# Patient Record
Sex: MALE | Race: WHITE | NOT HISPANIC OR LATINO
[De-identification: names, ages, dates, MRNs, and addresses within clinical notes are randomized per-mention and may not be internally consistent; named-entity substitution may affect disease eponyms.]

---

## 2019-07-19 ENCOUNTER — APPOINTMENT (OUTPATIENT)
Dept: DERMATOLOGY | Facility: CLINIC | Age: 69
End: 2019-07-19
Payer: MEDICARE

## 2019-07-19 PROBLEM — Z00.00 ENCOUNTER FOR PREVENTIVE HEALTH EXAMINATION: Status: ACTIVE | Noted: 2019-07-19

## 2019-07-19 PROCEDURE — 99201 OFFICE OUTPATIENT NEW 10 MINUTES: CPT

## 2019-11-27 ENCOUNTER — APPOINTMENT (OUTPATIENT)
Dept: DERMATOLOGY | Facility: CLINIC | Age: 69
End: 2019-11-27
Payer: MEDICARE

## 2019-11-27 ENCOUNTER — RESULT REVIEW (OUTPATIENT)
Age: 69
End: 2019-11-27

## 2019-11-27 PROCEDURE — 17110 DESTRUCTION B9 LES UP TO 14: CPT

## 2019-11-27 PROCEDURE — 99212 OFFICE O/P EST SF 10 MIN: CPT | Mod: 25

## 2020-07-31 ENCOUNTER — APPOINTMENT (OUTPATIENT)
Dept: DERMATOLOGY | Facility: CLINIC | Age: 70
End: 2020-07-31
Payer: MEDICARE

## 2020-07-31 PROCEDURE — 99214 OFFICE O/P EST MOD 30 MIN: CPT

## 2020-12-11 ENCOUNTER — APPOINTMENT (OUTPATIENT)
Dept: DERMATOLOGY | Facility: CLINIC | Age: 70
End: 2020-12-11
Payer: MEDICARE

## 2020-12-11 PROCEDURE — 99214 OFFICE O/P EST MOD 30 MIN: CPT

## 2021-01-20 ENCOUNTER — APPOINTMENT (OUTPATIENT)
Dept: DERMATOLOGY | Facility: CLINIC | Age: 71
End: 2021-01-20
Payer: MEDICARE

## 2021-01-20 ENCOUNTER — RESULT REVIEW (OUTPATIENT)
Age: 71
End: 2021-01-20

## 2021-01-20 PROCEDURE — 99213 OFFICE O/P EST LOW 20 MIN: CPT | Mod: 25

## 2021-01-20 PROCEDURE — 11102 TANGNTL BX SKIN SINGLE LES: CPT

## 2021-01-20 PROCEDURE — 11103 TANGNTL BX SKIN EA SEP/ADDL: CPT

## 2021-02-04 ENCOUNTER — APPOINTMENT (OUTPATIENT)
Dept: DERMATOLOGY | Facility: CLINIC | Age: 71
End: 2021-02-04
Payer: MEDICARE

## 2021-02-04 PROCEDURE — 13132 CMPLX RPR F/C/C/M/N/AX/G/H/F: CPT

## 2021-02-04 PROCEDURE — 17311 MOHS 1 STAGE H/N/HF/G: CPT

## 2021-02-25 ENCOUNTER — APPOINTMENT (OUTPATIENT)
Dept: DERMATOLOGY | Facility: CLINIC | Age: 71
End: 2021-02-25
Payer: MEDICARE

## 2021-02-25 PROCEDURE — 99213 OFFICE O/P EST LOW 20 MIN: CPT

## 2021-10-28 ENCOUNTER — APPOINTMENT (OUTPATIENT)
Dept: PULMONOLOGY | Facility: CLINIC | Age: 71
End: 2021-10-28
Payer: MEDICARE

## 2021-10-28 ENCOUNTER — INPATIENT (INPATIENT)
Facility: HOSPITAL | Age: 71
LOS: 0 days | Discharge: HOME | DRG: 190 | End: 2021-10-29
Attending: INTERNAL MEDICINE | Admitting: INTERNAL MEDICINE
Payer: MEDICARE

## 2021-10-28 VITALS
RESPIRATION RATE: 18 BRPM | SYSTOLIC BLOOD PRESSURE: 136 MMHG | BODY MASS INDEX: 42.66 KG/M2 | DIASTOLIC BLOOD PRESSURE: 74 MMHG | OXYGEN SATURATION: 90 % | HEART RATE: 81 BPM | HEIGHT: 72 IN | WEIGHT: 315 LBS

## 2021-10-28 VITALS
DIASTOLIC BLOOD PRESSURE: 101 MMHG | WEIGHT: 315 LBS | OXYGEN SATURATION: 92 % | RESPIRATION RATE: 20 BRPM | TEMPERATURE: 99 F | HEART RATE: 81 BPM | SYSTOLIC BLOOD PRESSURE: 169 MMHG

## 2021-10-28 DIAGNOSIS — R60.0 LOCALIZED EDEMA: ICD-10-CM

## 2021-10-28 DIAGNOSIS — I27.20 PULMONARY HYPERTENSION, UNSPECIFIED: ICD-10-CM

## 2021-10-28 DIAGNOSIS — J44.1 CHRONIC OBSTRUCTIVE PULMONARY DISEASE WITH (ACUTE) EXACERBATION: ICD-10-CM

## 2021-10-28 LAB
ALBUMIN SERPL ELPH-MCNC: 3.8 G/DL — SIGNIFICANT CHANGE UP (ref 3.3–5.2)
ALP SERPL-CCNC: 88 U/L — SIGNIFICANT CHANGE UP (ref 40–120)
ALT FLD-CCNC: 16 U/L — SIGNIFICANT CHANGE UP
ANION GAP SERPL CALC-SCNC: 9 MMOL/L — SIGNIFICANT CHANGE UP (ref 5–17)
AST SERPL-CCNC: 16 U/L — SIGNIFICANT CHANGE UP
BASOPHILS # BLD AUTO: 0.04 K/UL — SIGNIFICANT CHANGE UP (ref 0–0.2)
BASOPHILS NFR BLD AUTO: 0.5 % — SIGNIFICANT CHANGE UP (ref 0–2)
BILIRUB SERPL-MCNC: 0.5 MG/DL — SIGNIFICANT CHANGE UP (ref 0.4–2)
BUN SERPL-MCNC: 13.2 MG/DL — SIGNIFICANT CHANGE UP (ref 8–20)
CALCIUM SERPL-MCNC: 8.6 MG/DL — SIGNIFICANT CHANGE UP (ref 8.6–10.2)
CHLORIDE SERPL-SCNC: 94 MMOL/L — LOW (ref 98–107)
CO2 SERPL-SCNC: 31 MMOL/L — HIGH (ref 22–29)
CREAT SERPL-MCNC: 0.73 MG/DL — SIGNIFICANT CHANGE UP (ref 0.5–1.3)
D DIMER BLD IA.RAPID-MCNC: 176 NG/ML DDU — SIGNIFICANT CHANGE UP
EOSINOPHIL # BLD AUTO: 0.1 K/UL — SIGNIFICANT CHANGE UP (ref 0–0.5)
EOSINOPHIL NFR BLD AUTO: 1.1 % — SIGNIFICANT CHANGE UP (ref 0–6)
GAS PNL BLDA: SIGNIFICANT CHANGE UP
GLUCOSE SERPL-MCNC: 98 MG/DL — SIGNIFICANT CHANGE UP (ref 70–99)
HCT VFR BLD CALC: 41.7 % — SIGNIFICANT CHANGE UP (ref 39–50)
HGB BLD-MCNC: 13.8 G/DL — SIGNIFICANT CHANGE UP (ref 13–17)
IMM GRANULOCYTES NFR BLD AUTO: 0.3 % — SIGNIFICANT CHANGE UP (ref 0–1.5)
LYMPHOCYTES # BLD AUTO: 1.83 K/UL — SIGNIFICANT CHANGE UP (ref 1–3.3)
LYMPHOCYTES # BLD AUTO: 20.6 % — SIGNIFICANT CHANGE UP (ref 13–44)
MCHC RBC-ENTMCNC: 33.1 GM/DL — SIGNIFICANT CHANGE UP (ref 32–36)
MCHC RBC-ENTMCNC: 33.5 PG — SIGNIFICANT CHANGE UP (ref 27–34)
MCV RBC AUTO: 101.2 FL — HIGH (ref 80–100)
MONOCYTES # BLD AUTO: 0.74 K/UL — SIGNIFICANT CHANGE UP (ref 0–0.9)
MONOCYTES NFR BLD AUTO: 8.3 % — SIGNIFICANT CHANGE UP (ref 2–14)
NEUTROPHILS # BLD AUTO: 6.13 K/UL — SIGNIFICANT CHANGE UP (ref 1.8–7.4)
NEUTROPHILS NFR BLD AUTO: 69.2 % — SIGNIFICANT CHANGE UP (ref 43–77)
NT-PROBNP SERPL-SCNC: 266 PG/ML — SIGNIFICANT CHANGE UP (ref 0–300)
PLATELET # BLD AUTO: 258 K/UL — SIGNIFICANT CHANGE UP (ref 150–400)
POTASSIUM SERPL-MCNC: 4.5 MMOL/L — SIGNIFICANT CHANGE UP (ref 3.5–5.3)
POTASSIUM SERPL-SCNC: 4.5 MMOL/L — SIGNIFICANT CHANGE UP (ref 3.5–5.3)
PROT SERPL-MCNC: 6.7 G/DL — SIGNIFICANT CHANGE UP (ref 6.6–8.7)
RAPID RVP RESULT: SIGNIFICANT CHANGE UP
RBC # BLD: 4.12 M/UL — LOW (ref 4.2–5.8)
RBC # FLD: 13.6 % — SIGNIFICANT CHANGE UP (ref 10.3–14.5)
SARS-COV-2 RNA SPEC QL NAA+PROBE: SIGNIFICANT CHANGE UP
SODIUM SERPL-SCNC: 134 MMOL/L — LOW (ref 135–145)
TROPONIN T SERPL-MCNC: <0.01 NG/ML — SIGNIFICANT CHANGE UP (ref 0–0.06)
WBC # BLD: 8.87 K/UL — SIGNIFICANT CHANGE UP (ref 3.8–10.5)
WBC # FLD AUTO: 8.87 K/UL — SIGNIFICANT CHANGE UP (ref 3.8–10.5)

## 2021-10-28 PROCEDURE — 99285 EMERGENCY DEPT VISIT HI MDM: CPT

## 2021-10-28 PROCEDURE — 71045 X-RAY EXAM CHEST 1 VIEW: CPT | Mod: 26

## 2021-10-28 PROCEDURE — 99223 1ST HOSP IP/OBS HIGH 75: CPT

## 2021-10-28 PROCEDURE — 99204 OFFICE O/P NEW MOD 45 MIN: CPT | Mod: 25

## 2021-10-28 PROCEDURE — 99406 BEHAV CHNG SMOKING 3-10 MIN: CPT

## 2021-10-28 PROCEDURE — 93306 TTE W/DOPPLER COMPLETE: CPT | Mod: 26

## 2021-10-28 RX ORDER — IPRATROPIUM/ALBUTEROL SULFATE 18-103MCG
3 AEROSOL WITH ADAPTER (GRAM) INHALATION ONCE
Refills: 0 | Status: COMPLETED | OUTPATIENT
Start: 2021-10-28 | End: 2021-10-28

## 2021-10-28 RX ORDER — IPRATROPIUM/ALBUTEROL SULFATE 18-103MCG
3 AEROSOL WITH ADAPTER (GRAM) INHALATION EVERY 6 HOURS
Refills: 0 | Status: DISCONTINUED | OUTPATIENT
Start: 2021-10-28 | End: 2021-10-29

## 2021-10-28 RX ORDER — ENOXAPARIN SODIUM 100 MG/ML
40 INJECTION SUBCUTANEOUS
Refills: 0 | Status: DISCONTINUED | OUTPATIENT
Start: 2021-10-28 | End: 2021-10-29

## 2021-10-28 RX ORDER — ALBUTEROL 90 UG/1
1 AEROSOL, METERED ORAL
Qty: 2 | Refills: 0
Start: 2021-10-28

## 2021-10-28 RX ORDER — BUDESONIDE AND FORMOTEROL FUMARATE DIHYDRATE 160; 4.5 UG/1; UG/1
2 AEROSOL RESPIRATORY (INHALATION)
Refills: 0 | Status: DISCONTINUED | OUTPATIENT
Start: 2021-10-28 | End: 2021-10-29

## 2021-10-28 RX ORDER — IPRATROPIUM/ALBUTEROL SULFATE 18-103MCG
3 AEROSOL WITH ADAPTER (GRAM) INHALATION
Refills: 0 | Status: DISCONTINUED | OUTPATIENT
Start: 2021-10-28 | End: 2021-10-29

## 2021-10-28 RX ORDER — TIOTROPIUM BROMIDE 18 UG/1
1 CAPSULE ORAL; RESPIRATORY (INHALATION) DAILY
Refills: 0 | Status: DISCONTINUED | OUTPATIENT
Start: 2021-10-28 | End: 2021-10-29

## 2021-10-28 RX ORDER — NICOTINE POLACRILEX 2 MG
1 GUM BUCCAL DAILY
Refills: 0 | Status: DISCONTINUED | OUTPATIENT
Start: 2021-10-28 | End: 2021-10-29

## 2021-10-28 RX ADMIN — Medication 3 MILLILITER(S): at 23:15

## 2021-10-28 RX ADMIN — Medication 3 MILLILITER(S): at 10:39

## 2021-10-28 RX ADMIN — Medication 125 MILLIGRAM(S): at 10:39

## 2021-10-28 RX ADMIN — Medication 40 MILLIGRAM(S): at 18:17

## 2021-10-28 NOTE — PHYSICAL EXAM
[No Acute Distress] : no acute distress [Normal Appearance] : normal appearance [Normal Rate/Rhythm] : normal rate/rhythm [Normal S1, S2] : normal s1, s2 [No Murmurs] : no murmurs [No Acc Muscle Use] : no acc muscle use [Normal Rhythm and Effort] : normal rhythm and effort [Normal to Percussion] : normal to percussion [No Abnormalities] : no abnormalities [Normal Gait] : normal gait [No Clubbing] : no clubbing [No Cyanosis] : no cyanosis [TextBox_68] : decreased aiar entry bilat [TextBox_105] : 1 plus edema with stasis dermatidis

## 2021-10-28 NOTE — ED PROVIDER NOTE - PROGRESS NOTE DETAILS
Jose E: Attending spoke to Dr. Brown, pulmonolgist who sent him in. He states he has JOSLYN, needs ABG for CPAP machine script. Spoke to him about ABG results. Will place patient in obs until he is able to get machine. Jsoe E: Attending spoke to Dr. Brown, pulmonologist who sent him in. He states he has JOSLYN, needs ABG for CPAP machine script. Spoke to him about ABG results. Will keep patient until NIV is set up.

## 2021-10-28 NOTE — CONSULT NOTE ADULT - SUBJECTIVE AND OBJECTIVE BOX
PULMONARY CONSULT NOTE      PENNY FORRESTERN-[id number]    Patient is a 71y old  Male who presents with a chief complaint of     INTERVAL HPI/OVERNIGHT EVENTS:· HPI 70 yo male who denies medical hx presents to ED for SOB. States it has been present for years. Worsened in the last year. Went to see pulmonolgist for the first time who sent him to ED. Patient states SOB worse with exertion, assc with cough. No orthopnea. +Lower extremity edema. Denies chest pain, fever.50 pk yr smoker.        MEDICATIONS  (STANDING):      MEDICATIONS  (PRN):      Allergies    No Known Allergies    Intolerances        PAST MEDICAL & SURGICAL HISTORY:  No pertinent past medical history    No significant past surgical history        FAMILY HISTORY:      SOCIAL HISTORY  Smoking History:     REVIEW OF SYSTEMS:    CONSTITUTIONAL:  As per HPI.    HEENT:  Eyes:  No diplopia or blurred vision. ENT:  No earache, sore throat or runny nose.    CARDIOVASCULAR:  No pressure, squeezing, tightness, heaviness or aching about the chest; no palpitations.    RESPIRATORY:  Per HPI    GASTROINTESTINAL:  No nausea, vomiting or diarrhea.    GENITOURINARY:  No dysuria, frequency or urgency.    MUSCULOSKELETAL:  No joint pains    SKIN:  No new lesions.    NEUROLOGIC:  No paresthesias, fasciculations, seizures or weakness.    PSYCHIATRIC:  No disorder of thought or mood.    ENDOCRINE:  No heat or cold intolerance, polyuria or polydipsia.    HEMATOLOGICAL:  No easy bruising or bleeding.     Vital Signs Last 24 Hrs  T(C): 37.1 (28 Oct 2021 08:50), Max: 37.1 (28 Oct 2021 08:50)  T(F): 98.7 (28 Oct 2021 08:50), Max: 98.7 (28 Oct 2021 08:50)  HR: 81 (28 Oct 2021 08:50) (81 - 81)  BP: 169/101 (28 Oct 2021 08:50) (169/101 - 169/101)  BP(mean): --  RR: 20 (28 Oct 2021 08:50) (20 - 20)  SpO2: 92% (28 Oct 2021 08:50) (92% - 92%)    PHYSICAL EXAMINATION:    GENERAL: The patient is a well-developed, morbidly obese WM_in no apparent distress.     HEENT: Head is normocephalic and atraumatic. Extraocular muscles are intact. Mucous membranes are moist.     NECK: Supple.     LUNGS: diminished bs bilat    HEART: Regular rate and rhythm without murmur.    ABDOMEN: Soft, nontender, and nondistended.  No hepatosplenomegaly is noted.    EXTREMITIES: 2+ edema, stasis changes.    NEUROLOGIC: Grossly intact.    SKIN: No ulceration or induration present.      LABS:                        13.8   8.87  )-----------( 258      ( 28 Oct 2021 10:38 )             41.7     10-28    134<L>  |  94<L>  |  13.2  ----------------------------<  98  4.5   |  31.0<H>  |  0.73    Ca    8.6      28 Oct 2021 10:38    TPro  6.7  /  Alb  3.8  /  TBili  0.5  /  DBili  x   /  AST  16  /  ALT  16  /  AlkPhos  88  10-28        ABG - ( 28 Oct 2021 11:51 )  pH, Arterial: 7.370 pH, Blood: x     /  pCO2: 60    /  pO2: 66    / HCO3: 35    / Base Excess: 9.4   /  SaO2: 93.7  (RA)            CARDIAC MARKERS ( 28 Oct 2021 10:38 )  x     / <0.01 ng/mL / x     / x     / x          D-Dimer Assay, Quantitative: 176 ng/mL DDU (10-28-21 @ 10:38)    Serum Pro-Brain Natriuretic Peptide: 266 pg/mL (10-28-21 @ 10:38)          MICROBIOLOGY:    RADIOLOGY & ADDITIONAL STUDIES:cxr without infiltrates or chf.  Nl ht size

## 2021-10-28 NOTE — H&P ADULT - HISTORY OF PRESENT ILLNESS
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   HPI obtained from pt, who reports that over last few month he developed slowly progressing SOB a/w with occasional wheezing, productive cough, PND and orthopnea for which he finally was able to see Dr. Kan today and was sent to ER due to concern of sever dyspnea on ambulation with desaturations to 86 in office. At the moment seen pt reports no chest pain, leg pain, syncopal episodes, n/v/d, changes in urination and bm, he reported no medication or using inhaler  at home. he endorses chronic leg edema and skin discoloration.   On arriving to ED, pt is desaturates on ambulation to 87, with normal cbc, bmp, abg 7.37/60/66/35, LA 1.1, COVID neg, ecg with sinus, , negative d-dimers,  w/o apparent ischemic changes. Pt was seen by pulmonology team and was recommended for admission.

## 2021-10-28 NOTE — ED ADULT NURSE NOTE - NSIMPLEMENTINTERV_GEN_ALL_ED
Implemented All Universal Safety Interventions:  Scalf to call system. Call bell, personal items and telephone within reach. Instruct patient to call for assistance. Room bathroom lighting operational. Non-slip footwear when patient is off stretcher. Physically safe environment: no spills, clutter or unnecessary equipment. Stretcher in lowest position, wheels locked, appropriate side rails in place.

## 2021-10-28 NOTE — ED PROVIDER NOTE - CLINICAL SUMMARY MEDICAL DECISION MAKING FREE TEXT BOX
70 yo male with SOB. Does not see doctor. Mild rhonchi in right base. Everyday smoker, will treat. Will order labs, ekg.

## 2021-10-28 NOTE — ED ADULT NURSE NOTE - BOWEL SOUNDS LLQ
present Bilobed Transposition Flap Text: The defect edges were debeveled with a #15 scalpel blade.  Given the location of the defect and the proximity to free margins a bilobed transposition flap was deemed most appropriate.  Using a sterile surgical marker, an appropriate bilobe flap drawn around the defect.    The area thus outlined was incised deep to adipose tissue with a #15 scalpel blade.  The skin margins were undermined to an appropriate distance in all directions utilizing iris scissors.

## 2021-10-28 NOTE — CONSULT LETTER
[Dear  ___] : Dear  [unfilled], [Consult Letter:] : I had the pleasure of evaluating your patient, [unfilled]. [Please see my note below.] : Please see my note below. [Sincerely,] : Sincerely, [FreeTextEntry3] : [facility] De Jesus Brown DO [facility]\par Pulmonary Critical Care\par Director Pulmonary Division\par Medical Director Respiratory Therapy\par [facility]\par \par

## 2021-10-28 NOTE — H&P ADULT - PROBLEM SELECTOR PLAN 1
- admitted to tele bed for next 24 hr  - abg noted, pt mentation well and on room air at the moment, w/o WOB  - started on iv steroids 40 iv daily, duonebs q6h, ICS, spiriva  - nocturnal bipap 20/10/12 with abg in am   - cm/sw team consulted to assist with trilogy at home

## 2021-10-28 NOTE — H&P ADULT - NSHPPHYSICALEXAM_GEN_ALL_CORE
Vital Signs Last 24 Hrs  T(C): 37.1 (28 Oct 2021 15:47), Max: 37.1 (28 Oct 2021 08:50)  T(F): 98.7 (28 Oct 2021 15:47), Max: 98.7 (28 Oct 2021 08:50)  HR: 88 (28 Oct 2021 15:47) (81 - 88)  BP: 158/72 (28 Oct 2021 15:47) (158/72 - 169/101)  BP(mean): --  RR: 20 (28 Oct 2021 15:47) (20 - 20)  SpO2: 94% (28 Oct 2021 15:47) (92% - 94%)     General: NAD, truncal obesity    ENMT: no conjunctival injection, moist oral mucoses, good dentition   Neck and Lymph: no palpable masses in thyroids, no JVD, no lymphadenopathy   Lungs: poor air entry b/l, minimal scattered wheezing with somewhat prolong expiratory phase,  no crackles, no stridor  CVS: RRR, no M/R/G, + 1 pitting peripheral edema, palpable pedal pulses +2  Abdomen: soft, not distended  Extremities: no apparent deformities, preserved ROM  Skin: warm, dry, with 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   Pschyc; appropriate thought process, mood, response

## 2021-10-28 NOTE — ED ADULT NURSE NOTE - OBJECTIVE STATEMENT
Patient A&O x 3, stated he is fine and is just here for blood work. Patient stated he was sent to the ED by his doctor. No distress observed.

## 2021-10-28 NOTE — H&P ADULT - PROBLEM SELECTOR PLAN 3
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

## 2021-10-28 NOTE — CONSULT NOTE ADULT - ASSESSMENT
Imp--Pt with COPD, hypercapnic resp failure--chronic.  Adequate RA sat.  No active CHF  Plan--  Nebs, symbicort/spiriva.  The patient has chronic hypercapnic respiratory failure   due to COPD     and responded to NIV.  The patient requires advanced therapies for chronic respiratory failure.  At this time other therapies such as BiPAP-ST have been attempted and failed.  NIV in Guaranteed Augmented Targeted Tidal Volume modes not available on any BiPAP or BiPAP ST devices will be ordereed to minimize the likelihood of further clinical decline or rehospitalizations.    can go home once NIV in place.  Also will need home O2. F/U our office

## 2021-10-28 NOTE — H&P ADULT - NSHPLABSRESULTS_GEN_ALL_CORE
LABS:                        13.8   8.87  )-----------( 258      ( 28 Oct 2021 10:38 )             41.7     10-28    134<L>  |  94<L>  |  13.2  ----------------------------<  98  4.5   |  31.0<H>  |  0.73    Ca    8.6      28 Oct 2021 10:38    TPro  6.7  /  Alb  3.8  /  TBili  0.5  /  DBili  x   /  AST  16  /  ALT  16  /  AlkPhos  88  10-28      CARDIAC MARKERS ( 28 Oct 2021 10:38 )  x     / <0.01 ng/mL / x     / x     / x              CAPILLARY BLOOD GLUCOSE            RADIOLOGY & ADDITIONAL TESTS:  Results Reviewed:   Imaging Personally Reviewed: CXR   Electrocardiogram Personally Reviewed: SINUS, LAD, no apparent ischemic changes, qtc 439

## 2021-10-28 NOTE — ED PROVIDER NOTE - ATTENDING CONTRIBUTION TO CARE
The patient seen and examined    COPD exacerbation    I, Mayito Dotson, performed the initial face to face bedside interview with this patient regarding history of present illness, review of symptoms and relevant past medical, social and family history.  I completed an independent physical examination.  I was the initial provider who evaluated this patient. I have signed out the follow up of any pending tests (i.e. labs, radiological studies) to the resident.  I have communicated the patient’s plan of care and disposition with the resident.

## 2021-10-28 NOTE — ED PROVIDER NOTE - OBJECTIVE STATEMENT
70 yo male who denies medical hx presents to ED for SOB. States it has been present for years. Worsened in the last year. Went to see pulmonolgist for the first time who sent him to ED. Patient states SOB worse with exertion, assc with cough. No orthopnea. +Lower extremity edema. Denies chest pain, fever.

## 2021-10-28 NOTE — DISCUSSION/SUMMARY
[FreeTextEntry1] : Clinical COPD /Obesity/OHS \par Acute on chronic hypoxemic, hypercapnic respiratory failure\par Nicotine addiction\par Clinical cor pulmonale\par Long discussion with pt and wife\par Needs admission with CXR, ABG, EKG, D dimer, Labs\par Will need nocturnal NIV and home 02\par Smoking cessation\par Medrol, nebs, abx, Trelegy as out pt, home neb\par Vaccinations need strongly encouraged\par Will follow up post ER\par prognosis guarded\par ER called, and case discussed with Dr Bailey\par

## 2021-10-28 NOTE — HISTORY OF PRESENT ILLNESS
[TextBox_4] : heavy snorer\par witnessed apneas\par smoker x 40 yrs \par no fever, chill, chest pain\par very SOB with everyday activities\par no chest pain\par doesn’t see any doctors\par cats\par cough with sputum\par no hx COPD and asthma\par weight gain, last few years\par unvaccinated

## 2021-10-28 NOTE — COUNSELING
[Risk of tobacco use and health benefits of smoking cessation discussed] : Risk of tobacco use and health benefits of smoking cessation discussed [Cessation strategies including cessation program discussed] : Cessation strategies including cessation program discussed [Use of nicotine replacement therapies and other medications discussed] : Use of nicotine replacement therapies and other medications discussed [Encouraged to pick a quit date and identify support needed to quit] : Encouraged to pick a quit date and identify support needed to quit [Tobacco Use Cessation Intermediate Greater Than 3 Minutes Up to 10 Minutes] : Tobacco Use Cessation Intermediate Greater Than 3 Minutes Up to 10 Minutes [FreeTextEntry1] : 4

## 2021-10-29 ENCOUNTER — TRANSCRIPTION ENCOUNTER (OUTPATIENT)
Age: 71
End: 2021-10-29

## 2021-10-29 VITALS
SYSTOLIC BLOOD PRESSURE: 148 MMHG | OXYGEN SATURATION: 91 % | TEMPERATURE: 98 F | RESPIRATION RATE: 18 BRPM | HEART RATE: 85 BPM | DIASTOLIC BLOOD PRESSURE: 72 MMHG

## 2021-10-29 DIAGNOSIS — J44.1 CHRONIC OBSTRUCTIVE PULMONARY DISEASE WITH (ACUTE) EXACERBATION: ICD-10-CM

## 2021-10-29 LAB
ANION GAP SERPL CALC-SCNC: 11 MMOL/L — SIGNIFICANT CHANGE UP (ref 5–17)
BASE EXCESS BLDA CALC-SCNC: 11.2 MMOL/L — HIGH (ref -2–3)
BLOOD GAS COMMENTS ARTERIAL: SIGNIFICANT CHANGE UP
BLOOD GAS COMMENTS ARTERIAL: SIGNIFICANT CHANGE UP
BUN SERPL-MCNC: 16.8 MG/DL — SIGNIFICANT CHANGE UP (ref 8–20)
CALCIUM SERPL-MCNC: 8.9 MG/DL — SIGNIFICANT CHANGE UP (ref 8.6–10.2)
CHLORIDE SERPL-SCNC: 94 MMOL/L — LOW (ref 98–107)
CO2 SERPL-SCNC: 30 MMOL/L — HIGH (ref 22–29)
COVID-19 SPIKE DOMAIN AB INTERP: NEGATIVE — SIGNIFICANT CHANGE UP
COVID-19 SPIKE DOMAIN ANTIBODY RESULT: 0.4 U/ML — SIGNIFICANT CHANGE UP
CREAT SERPL-MCNC: 0.83 MG/DL — SIGNIFICANT CHANGE UP (ref 0.5–1.3)
GAS PNL BLDA: SIGNIFICANT CHANGE UP
GLUCOSE SERPL-MCNC: 145 MG/DL — HIGH (ref 70–99)
HCO3 BLDA-SCNC: 36 MMOL/L — HIGH (ref 21–28)
HCV AB S/CO SERPL IA: 0.12 S/CO — SIGNIFICANT CHANGE UP (ref 0–0.99)
HCV AB SERPL-IMP: SIGNIFICANT CHANGE UP
HOROWITZ INDEX BLDA+IHG-RTO: 0.21 — SIGNIFICANT CHANGE UP
PCO2 BLDA: 55 MMHG — HIGH (ref 35–48)
PH BLDA: 7.42 — SIGNIFICANT CHANGE UP (ref 7.35–7.45)
PO2 BLDA: 64 MMHG — LOW (ref 83–108)
POTASSIUM SERPL-MCNC: 5 MMOL/L — SIGNIFICANT CHANGE UP (ref 3.5–5.3)
POTASSIUM SERPL-SCNC: 5 MMOL/L — SIGNIFICANT CHANGE UP (ref 3.5–5.3)
SAO2 % BLDA: 94 % — SIGNIFICANT CHANGE UP (ref 94–98)
SARS-COV-2 IGG+IGM SERPL QL IA: 0.4 U/ML — SIGNIFICANT CHANGE UP
SARS-COV-2 IGG+IGM SERPL QL IA: NEGATIVE — SIGNIFICANT CHANGE UP
SODIUM SERPL-SCNC: 135 MMOL/L — SIGNIFICANT CHANGE UP (ref 135–145)

## 2021-10-29 PROCEDURE — 99233 SBSQ HOSP IP/OBS HIGH 50: CPT

## 2021-10-29 RX ORDER — TIOTROPIUM BROMIDE 18 UG/1
1 CAPSULE ORAL; RESPIRATORY (INHALATION)
Qty: 30 | Refills: 0
Start: 2021-10-29 | End: 2021-11-27

## 2021-10-29 RX ORDER — BUDESONIDE AND FORMOTEROL FUMARATE DIHYDRATE 160; 4.5 UG/1; UG/1
2 AEROSOL RESPIRATORY (INHALATION)
Qty: 2 | Refills: 0
Start: 2021-10-29 | End: 2021-11-27

## 2021-10-29 RX ADMIN — Medication 3 MILLILITER(S): at 02:28

## 2021-10-29 RX ADMIN — Medication 3 MILLILITER(S): at 14:34

## 2021-10-29 RX ADMIN — BUDESONIDE AND FORMOTEROL FUMARATE DIHYDRATE 2 PUFF(S): 160; 4.5 AEROSOL RESPIRATORY (INHALATION) at 09:01

## 2021-10-29 RX ADMIN — Medication 40 MILLIGRAM(S): at 05:30

## 2021-10-29 RX ADMIN — Medication 3 MILLILITER(S): at 08:55

## 2021-10-29 NOTE — PROGRESS NOTE ADULT - ASSESSMENT
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 SPo2 on ambulation of 86% in the office. Admitted for acute hypoxic respiratory failure. ABG consistent with hypercapnia. COVID negative; BNP of 266, negative D dimmer.      Assessment/Plan:    1. Acute hypoxic hypercapnic respiratory failure: Wean o2 as tolerated  IV Steroids  Nebs  NIV nocturnal, will benefit from home nocturnal NIV  Pulmonary following    2. Pulmonary hypertension: Follow up echocardiogram    3. 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 SPo2 on ambulation of 86% in the office. Admitted for acute hypoxic respiratory failure. ABG consistent with hypercapnia. COVID negative; BNP of 266, negative D dimmer.      Assessment/Plan:    1. Acute hypoxic hypercapnic respiratory failure: Not on o2 at rest  Check Spo2 with ambulation  Taper Iv steroids  Nebs  NIV nocturnal, will benefit from home nocturnal NIV  Pulmonary following    2. Pulmonary hypertension: Follow up echocardiogram    3. 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 SPo2 on ambulation of 86% in the office. Admitted for acute hypoxic respiratory failure. ABG consistent with hypercapnia. COVID negative; BNP of 266, negative D dimmer.      Assessment/Plan:    1. Chronic hypoxic hypercapnic respiratory failure: Not on o2 at rest  Spo2 with ambulation on room air of 87%; patient will require home oxygen on discharge  Taper Iv steroids to PO on discharge  Nebs, symbicort and spiriva on discharge  NIV nocturnal, will benefit from home nocturnal NIV  Pulmonary following    2. Pulmonary hypertension: Follow up echocardiogram    3. Lower extremity edema: likely secondary to chorionic venous insufficieny >> will need OP f/u with vascular team  - TTE as above    VTE- Lovenox    Code Status; Full Code    Discharge disposition: Discharge home once home oxygen and trilogy setup and approved

## 2021-10-29 NOTE — PROGRESS NOTE ADULT - SUBJECTIVE AND OBJECTIVE BOX
PULMONARY PROGRESS NOTE      [id number]    Patient is a 71y old  Male who presents with a chief complaint of SOB (29 Oct 2021 08:28)  70 yo male who denies medical hx presents to ED for SOB. States it has been present for years. Worsened in the last year. Went to see pulmonolgist for the first time who sent him to ED. Patient states SOB worse with exertion, assc with cough. No orthopnea. +Lower extremity edema. Denies chest pain, fever.  50 pk yr smoker    INTERVAL HPI/OVERNIGHT EVENTS:  Little benefit to Bipap with back up rate overnight    MEDICATIONS  (STANDING):  albuterol/ipratropium for Nebulization 3 milliLiter(s) Nebulizer every 6 hours  budesonide 160 MICROgram(s)/formoterol 4.5 MICROgram(s) Inhaler 2 Puff(s) Inhalation two times a day  enoxaparin Injectable 40 milliGRAM(s) SubCutaneous two times a day  methylPREDNISolone sodium succinate Injectable 40 milliGRAM(s) IV Push daily  nicotine - 21 mG/24Hr(s) Patch 1 Patch Transdermal daily  tiotropium 18 MICROgram(s) Capsule 1 Capsule(s) Inhalation daily      MEDICATIONS  (PRN):  albuterol/ipratropium for Nebulization 3 milliLiter(s) Nebulizer every 2 hours PRN Shortness of Breath and/or Wheezing      Allergies    No Known Allergies    Intolerances        PAST MEDICAL & SURGICAL HISTORY:  No significant past surgical history        SOCIAL HISTORY  Smoking History:       REVIEW OF SYSTEMS:    CONSTITUTIONAL:  No distress    HEENT:  Eyes:  No diplopia or blurred vision. ENT:  No earache, sore throat or runny nose.    CARDIOVASCULAR:  No pressure, squeezing, tightness, heaviness or aching about the chest; no palpitations.    RESPIRATORY:  No cough,  PND or orthopnea. Mod SOBOE    GASTROINTESTINAL:  No nausea, vomiting or diarrhea.    GENITOURINARY:  No dysuria, frequency or urgency.    NEUROLOGIC:  No paresthesias, fasciculations, seizures or weakness.    PSYCHIATRIC:  No disorder of thought or mood.    Vital Signs Last 24 Hrs  T(C): 36.9 (29 Oct 2021 06:26), Max: 37.1 (28 Oct 2021 15:47)  T(F): 98.4 (29 Oct 2021 06:26), Max: 98.7 (28 Oct 2021 15:47)  HR: 88 (29 Oct 2021 09:01) (76 - 89)  BP: 130/68 (29 Oct 2021 06:26) (130/68 - 170/69)  BP(mean): --  RR: 18 (29 Oct 2021 06:26) (17 - 20)  SpO2: 92% (29 Oct 2021 09:01) (92% - 96%)    PHYSICAL EXAMINATION:       LUNGS: Diminished to auscultation without wheezing, rales or rhonchi;     HEART: Regular rate and rhythm without murmur.    ABDOMEN: Soft, nontender, and nondistended.      EXTREMITIES: Without any cyanosis, clubbing, rash, lesions or edema.    NEUROLOGIC: Grossly intact.    LABS:                        13.8   8.87  )-----------( 258      ( 28 Oct 2021 10:38 )             41.7     10-29    135  |  94<L>  |  16.8  ----------------------------<  145<H>  5.0   |  30.0<H>  |  0.83    Ca    8.9      29 Oct 2021 03:40    TPro  6.7  /  Alb  3.8  /  TBili  0.5  /  DBili  x   /  AST  16  /  ALT  16  /  AlkPhos  88  10-28        ABG - ( 29 Oct 2021 06:50 )  pH, Arterial: 7.420 pH, Blood: x     /  pCO2: 55    /  pO2: 64    / HCO3: 36    / Base Excess: 11.2  /  SaO2: 94.0              CARDIAC MARKERS ( 28 Oct 2021 10:38 )  x     / <0.01 ng/mL / x     / x     / x            Serum Pro-Brain Natriuretic Peptide: 266 pg/mL (10-28-21 @ 10:38)        RADIOLOGY & ADDITIONAL STUDIES:   EXAM:  XR CHEST PORTABLE URGENT 1V                          PROCEDURE DATE:  10/28/2021          INTERPRETATION:  Clinical history: 71-year-old male, chest pain.    Portable view of the chest without comparison demonstrates a normal cardiac silhouette and normal pulmonary vasculature with no consolidation, effusion, gross adenopathy, pneumothorax or acute osseous finding.    IMPRESSION:  No acute radiographic findings    --- End of Report ---            RAQUEL REYES DO; Attending Radiologist  This document has been electronically signed. Oct 28 2021  2:52PM

## 2021-10-29 NOTE — DISCHARGE NOTE NURSING/CASE MANAGEMENT/SOCIAL WORK - NSDCFUADDAPPT_GEN_ALL_CORE_FT
You have a followup appointment with Pulmonary Dr. Jose De Jesus Brown on Tuesday November 9th at 2:30 PM  Phone # [phone]. Please keep this appt.   You have a followup appointment with Pulmonary Dr. Jose De Jesus Brown on Tuesday November 9th at 2:30 PM  Phone # [phone]. Please keep this appt.  Pt aware that he will  meds from [facility] prior to discharge.

## 2021-10-29 NOTE — PROGRESS NOTE ADULT - SUBJECTIVE AND OBJECTIVE BOX
CC: Follow up    INTERVAL HPI/OVERNIGHT EVENTS: Patient seen and examined, denies sob, chest pain or palpitations. Recently complaints of dyspnea on exertion. Currently not requiring O2 at rest.       Vital Signs Last 24 Hrs  T(C): 36.9 (29 Oct 2021 06:26), Max: 37.1 (28 Oct 2021 15:47)  T(F): 98.4 (29 Oct 2021 06:26), Max: 98.7 (28 Oct 2021 15:47)  HR: 88 (29 Oct 2021 09:01) (76 - 89)  BP: 130/68 (29 Oct 2021 06:26) (130/68 - 170/69)  BP(mean): --  RR: 18 (29 Oct 2021 06:26) (17 - 20)  SpO2: 92% (29 Oct 2021 09:01) (92% - 96%)    PHYSICAL EXAM:    GENERAL: NAD,AOX3  HEAD:  Atraumatic, Normocephalic  EYES:  conjunctiva and sclera clear  ENMT: Moist mucous membranes  NECK: Supple, No JVD  CHEST/LUNG: Clear to auscultation bilaterally; No rales, rhonchi, wheezing, or rubs  HEART: Regular rate and rhythm; No murmurs, rubs, or gallops  ABDOMEN: Soft, Nontender, Nondistended; Bowel sounds present  EXTREMITIES:  2+ Peripheral Pulses, No clubbing, cyanosis  Chronic venous stasis changes bilateral LE with LE edema- chronic per patient         MEDICATIONS  (STANDING):  albuterol/ipratropium for Nebulization 3 milliLiter(s) Nebulizer every 6 hours  budesonide 160 MICROgram(s)/formoterol 4.5 MICROgram(s) Inhaler 2 Puff(s) Inhalation two times a day  enoxaparin Injectable 40 milliGRAM(s) SubCutaneous two times a day  methylPREDNISolone sodium succinate Injectable 40 milliGRAM(s) IV Push daily  nicotine - 21 mG/24Hr(s) Patch 1 Patch Transdermal daily  tiotropium 18 MICROgram(s) Capsule 1 Capsule(s) Inhalation daily    MEDICATIONS  (PRN):  albuterol/ipratropium for Nebulization 3 milliLiter(s) Nebulizer every 2 hours PRN Shortness of Breath and/or Wheezing      Allergies    No Known Allergies    Intolerances          LABS:                          13.8   8.87  )-----------( 258      ( 28 Oct 2021 10:38 )             41.7     10-29    135  |  94<L>  |  16.8  ----------------------------<  145<H>  5.0   |  30.0<H>  |  0.83    Ca    8.9      29 Oct 2021 03:40    TPro  6.7  /  Alb  3.8  /  TBili  0.5  /  DBili  x   /  AST  16  /  ALT  16  /  AlkPhos  88  10-28          RADIOLOGY & ADDITIONAL TESTS:

## 2021-10-29 NOTE — DISCHARGE NOTE NURSING/CASE MANAGEMENT/SOCIAL WORK - NSDCPETBCESMAN_GEN_ALL_CORE
Impression: Primary open-angle glaucoma, left eye, mild stage: H40.1121. OS.  Plan: see notes #1 If you are a smoker, it is important for your health to stop smoking. Please be aware that second hand smoke is also harmful.

## 2021-10-29 NOTE — DISCHARGE NOTE PROVIDER - NSDCMRMEDTOKEN_GEN_ALL_CORE_FT
budesonide-formoterol 160 mcg-4.5 mcg/inh inhalation aerosol: 2 puff(s) inhaled 2 times a day   predniSONE 10 mg oral tablet: 4 tab(s) orally once a day x 2 days  3 tab(s) PO Kyler x 2 days  2 tab(s) PO Kyler x 2 days  1 tab(s) PO OD x 2 days  tiotropium 18 mcg inhalation capsule: 1 cap(s) inhaled once a day

## 2021-10-29 NOTE — DISCHARGE NOTE PROVIDER - NSDCCPCAREPLAN_GEN_ALL_CORE_FT
PRINCIPAL DISCHARGE DIAGNOSIS  Diagnosis: COPD exacerbation  Assessment and Plan of Treatment: Improved on IV steroids, nebulizer inhailers. Seen by Pulmonology, would benefit from NIV at night. Please follow up with your primary care physician and Pulmonology within 1 week.      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 please follow up with vascular team as outpatient    Diagnosis: Pulmonary hypertension  Assessment and Plan of Treatment: Echocardiogram ordered     PRINCIPAL DISCHARGE DIAGNOSIS  Diagnosis: COPD exacerbation  Assessment and Plan of Treatment: Improved on IV steroids, nebulizer inhailers. Seen by Pulmonology, would benefit from NIV at night. Please follow up with your primary care physician and Pulmonology within 1 week.      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 please follow up with vascular team as outpatient    Diagnosis: Chronic hypercapnic respiratory failure  Assessment and Plan of Treatment: Trilegy at bedtime

## 2021-10-29 NOTE — DISCHARGE NOTE PROVIDER - PROVIDER TOKENS
PROVIDER:[TOKEN:[[id number]],FOLLOWUP:[1 week]],PROVIDER:[TOKEN:[[id number]],FOLLOWUP:[1 week]]

## 2021-10-29 NOTE — DISCHARGE NOTE PROVIDER - CARE PROVIDERS DIRECT ADDRESSES
[email or web address]

## 2021-10-29 NOTE — DISCHARGE NOTE PROVIDER - CARE PROVIDER_API CALL
Neville Hernandez)  Critical Care Medicine; Internal Medicine; Pulmonary Disease; Sleep Medicine  [address] [facility] [facility], [facility] [address]  [place] [address]  Phone: [phone]  Fax: [phone]  Follow Up Time: 1 week    Jayson Maya)  Family Medicine  [address] [place] [address]  Phone: [phone]  Fax: [phone]  Follow Up Time: 1 week

## 2021-10-29 NOTE — DISCHARGE NOTE NURSING/CASE MANAGEMENT/SOCIAL WORK - PATIENT PORTAL LINK FT
You can access the FollowMyHealth Patient Portal offered by [facility] by registering at the following website: [email or web address]. By joining [facility]’s FollowMyHealth portal, you will also be able to view your health information using other applications (apps) compatible with our system.

## 2021-10-29 NOTE — PROGRESS NOTE ADULT - ASSESSMENT
Imp--Pt with  severe COPD, hypercapnic resp failure--chronic.  Adequate RA sat.  No active CHF  The patient has chronic hypercapnic respiratory failure   due to COPD     and responded to NIV.  The patient requires advanced therapies for chronic respiratory failure.  At this time other therapies such as BiPAP-ST (RAte 12; IPAP 20; EPAP 10; FIO2 40) have been attempted and failed.  NIV in Guaranteed Augmented Targeted Tidal Volume modes not available on any BiPAP or BiPAP ST devices will be ordered to minimize the likelihood of further clinical decline or recurrent hospitalizations.    Plan--  Nebs, symbicort/spiriva.  can go home once NIV in place.  Also will need home O2. F/U our office

## 2021-10-29 NOTE — DISCHARGE NOTE PROVIDER - HOSPITAL COURSE
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 SPo2 on ambulation of 86% in the office. Admitted for acute hypoxic respiratory failure. ABG consistent with hypercapnia. COVID negative; BNP of 266, negative D dimmer. Started on IV solumedrol. Improved on nebulizers/spiriva. Seen by Pulmonology. would benefit from home nocturnal NIV. TTE ordered for Pulmonary hypertension and Lower extremity edema    Discharge planning time 35 minutes. 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 SPo2 on ambulation of 86% in the office. Admitted for acute hypoxic respiratory failure. ABG consistent with hypercapnia. COVID negative; BNP of 266, negative D dimmer. Started on IV solumedrol. Improved on nebulizers/spiriva. Seen by Pulmonology. would benefit from home nocturnal NIV. TTE ordered for Pulmonary hypertension and Lower extremity edema. SPo2 on room air with ambulation of 87%, to be discharged  home with home oxygen.     Discharge planning time 46 minutes.

## 2021-11-01 DIAGNOSIS — Z29.9 ENCOUNTER FOR PROPHYLACTIC MEASURES, UNSPECIFIED: ICD-10-CM

## 2021-11-02 ENCOUNTER — NON-APPOINTMENT (OUTPATIENT)
Age: 71
End: 2021-11-02

## 2021-11-09 ENCOUNTER — APPOINTMENT (OUTPATIENT)
Dept: PULMONOLOGY | Facility: CLINIC | Age: 71
End: 2021-11-09
Payer: MEDICARE

## 2021-11-09 VITALS
OXYGEN SATURATION: 96 % | RESPIRATION RATE: 18 BRPM | SYSTOLIC BLOOD PRESSURE: 150 MMHG | BODY MASS INDEX: 47.2 KG/M2 | DIASTOLIC BLOOD PRESSURE: 78 MMHG | HEART RATE: 74 BPM | WEIGHT: 315 LBS

## 2021-11-09 PROCEDURE — 99214 OFFICE O/P EST MOD 30 MIN: CPT

## 2021-11-09 RX ORDER — PANTOPRAZOLE 40 MG/1
40 TABLET, DELAYED RELEASE ORAL DAILY
Qty: 30 | Refills: 0 | Status: DISCONTINUED | COMMUNITY
Start: 2021-11-01 | End: 2021-11-09

## 2021-11-09 NOTE — HISTORY OF PRESENT ILLNESS
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 SPo2 on ambulation of 86% \par in the office. Admitted for acute hypoxic respiratory failure. ABG consistent \par with hypercapnia. COVID negative; BNP of 266, negative D dimmer. Started on IV \par solumedrol. Improved on nebulizers/spiriva. Seen by Pulmonology. would benefit \par from home nocturnal NIV. TTE ordered for Pulmonary hypertension and Lower \par extremity edema. SPo2 on room air with ambulation of 87%, to be discharged \par home with home oxygen.\par \par doing better post discharge\par has home 02- not using\par has nocturnal NIv - Astral - using intermittently\par smoke free\par on Symbicort and Spiriva\par no fever, chill, chest pain \par \par

## 2021-11-09 NOTE — CONSULT LETTER
[Dear  ___] : Dear  [unfilled], [Consult Letter:] : I had the pleasure of evaluating your patient, [unfilled]. [Please see my note below.] : Please see my note below. [Sincerely,] : Sincerely, [FreeTextEntry3] : Jose De Jesus Brown DO [facility]\par Pulmonary Critical Care\par Director Pulmonary Division\par Medical Director Respiratory Therapy\par [facility]\par \par

## 2021-11-09 NOTE — DISCUSSION/SUMMARY
[FreeTextEntry1] : Clinical COPD /Obesity/OHS - cor pulmonale by echocardiogram with PH\par much improved short hospital stay [facility] 20/28-10/29\par has Nocturnal NIV -Astral, - compliance stressed, has home 02\par continued Smoking cessation stressed\par continue Spiriva and Symbicort with spacer\par Low dose rad CT scan pending, PFTs pending\par Vaccinations need strongly encouraged\par Weight loss\par baseline Cardiology eval\par 4-6 weeks or sooner if needed\par

## 2021-11-09 NOTE — PROCEDURE
[FreeTextEntry1] : hospital records reviewed [facility] 10/21\par CXR nad\par D dimer negative, covid negative, no eos\par ABG with compensated hypercapnia\par ECHO; mild enlarged RV, mod PH

## 2021-11-09 NOTE — COUNSELING
[Risk of tobacco use and health benefits of smoking cessation discussed] : Risk of tobacco use and health benefits of smoking cessation discussed [Cessation strategies including cessation program discussed] : Cessation strategies including cessation program discussed [Use of nicotine replacement therapies and other medications discussed] : Use of nicotine replacement therapies and other medications discussed [Encouraged to pick a quit date and identify support needed to quit] : Encouraged to pick a quit date and identify support needed to quit [FreeTextEntry1] : 4

## 2021-11-11 ENCOUNTER — NON-APPOINTMENT (OUTPATIENT)
Age: 71
End: 2021-11-11

## 2021-11-11 VITALS — BODY MASS INDEX: 42.66 KG/M2 | WEIGHT: 315 LBS | HEIGHT: 72 IN

## 2021-11-11 DIAGNOSIS — F17.219 NICOTINE DEPENDENCE, CIGARETTES, WITH UNSPECIFIED NICOTINE-INDUCED DISORDERS: ICD-10-CM

## 2021-11-11 DIAGNOSIS — C44.319 BASAL CELL CARCINOMA OF SKIN OF OTHER PARTS OF FACE: ICD-10-CM

## 2021-11-11 DIAGNOSIS — Z87.891 PERSONAL HISTORY OF NICOTINE DEPENDENCE: ICD-10-CM

## 2021-11-11 DIAGNOSIS — F17.200 NICOTINE DEPENDENCE, UNSPECIFIED, UNCOMPLICATED: ICD-10-CM

## 2021-11-11 NOTE — HISTORY OF PRESENT ILLNESS
[TextBox_13] : Referred by Dr. Jose De Jesus Brown.\par \par Mr. FORRESTER is a 71 year old male with a history of COPD, chronic bronchitis and respiratory failure.\par \par He was called to review eligibility for Low-Dose CT lung cancer screening.  Reviewed and confirmed that the patient meets screening eligibility criteria:\par \par 71 years old \par \par Smoking Status: Former smoker, recently quit\par \par Number of pack(s) per day: 1\par Number of years smoked: 40\par Number of pack years smokin\par \par Number of years since quitting smoking: less than 1\par Quit year: Oct 2021\par \par Mr. FORRESTER denies any symptoms of lung cancer, including new cough, change in cough, hemoptysis, and unintentional weight loss.\par \par Mr. FORRESTER denies any personal history of lung cancer.  No lung cancer in a first degree relative.  Denies any history of occupational exposures.

## 2021-11-12 ENCOUNTER — APPOINTMENT (OUTPATIENT)
Dept: CT IMAGING | Facility: CLINIC | Age: 71
End: 2021-11-12
Payer: MEDICARE

## 2021-11-12 ENCOUNTER — OUTPATIENT (OUTPATIENT)
Dept: OUTPATIENT SERVICES | Facility: HOSPITAL | Age: 71
LOS: 1 days | End: 2021-11-12
Payer: MEDICARE

## 2021-11-12 DIAGNOSIS — F17.219 NICOTINE DEPENDENCE, CIGARETTES, WITH UNSPECIFIED NICOTINE-INDUCED DISORDERS: ICD-10-CM

## 2021-11-12 PROCEDURE — 71271 CT THORAX LUNG CANCER SCR C-: CPT | Mod: 26

## 2021-11-12 PROCEDURE — 71271 CT THORAX LUNG CANCER SCR C-: CPT

## 2021-11-17 ENCOUNTER — NON-APPOINTMENT (OUTPATIENT)
Age: 71
End: 2021-11-17

## 2021-11-29 DIAGNOSIS — Z01.818 ENCOUNTER FOR OTHER PREPROCEDURAL EXAMINATION: ICD-10-CM

## 2021-11-30 ENCOUNTER — APPOINTMENT (OUTPATIENT)
Dept: DISASTER EMERGENCY | Facility: CLINIC | Age: 71
End: 2021-11-30

## 2021-12-01 LAB — SARS-COV-2 N GENE NPH QL NAA+PROBE: NOT DETECTED

## 2021-12-03 ENCOUNTER — APPOINTMENT (OUTPATIENT)
Dept: PULMONOLOGY | Facility: CLINIC | Age: 71
End: 2021-12-03
Payer: MEDICARE

## 2021-12-03 ENCOUNTER — NON-APPOINTMENT (OUTPATIENT)
Age: 71
End: 2021-12-03

## 2021-12-03 VITALS — HEIGHT: 72 IN | WEIGHT: 315 LBS | BODY MASS INDEX: 42.66 KG/M2

## 2021-12-03 PROCEDURE — 94010 BREATHING CAPACITY TEST: CPT

## 2021-12-03 PROCEDURE — 94727 GAS DIL/WSHOT DETER LNG VOL: CPT

## 2021-12-03 PROCEDURE — 94729 DIFFUSING CAPACITY: CPT

## 2021-12-03 PROCEDURE — 85018 HEMOGLOBIN: CPT | Mod: QW

## 2021-12-09 ENCOUNTER — APPOINTMENT (OUTPATIENT)
Dept: PULMONOLOGY | Facility: CLINIC | Age: 71
End: 2021-12-09
Payer: MEDICARE

## 2021-12-09 VITALS
RESPIRATION RATE: 18 BRPM | WEIGHT: 315 LBS | DIASTOLIC BLOOD PRESSURE: 80 MMHG | SYSTOLIC BLOOD PRESSURE: 135 MMHG | OXYGEN SATURATION: 95 % | BODY MASS INDEX: 47.74 KG/M2 | HEART RATE: 77 BPM

## 2021-12-09 PROCEDURE — 99214 OFFICE O/P EST MOD 30 MIN: CPT

## 2021-12-09 NOTE — REASON FOR VISIT
[Follow-Up] : a follow-up visit [COPD] : COPD [Shortness of Breath] : shortness of breath [Asthma] : asthma

## 2021-12-09 NOTE — DISCUSSION/SUMMARY
[FreeTextEntry1] :  Severe COPD /asthma /Obesity/OHS - cor pulmonale by echocardiogram with PH\par Remains smoke free, dyspnea improved\par Complaint with  Nocturnal NIV -Astral, - with 02\par continued Smoking cessation stressed\par continue Spiriva and Symbicort with spacer\par Low dose rad CT scan reviewed, small nodules- f/u 1 yr, sebaceous cyst discussed with pt, Derm f/u\par Vaccinations need strongly encouraged\par Weight loss\par baseline Cardiology eval\par alpha 1 , asthma profile at follow up\par 12  weeks or sooner if needed, action plan reviewed\par

## 2021-12-09 NOTE — CONSULT LETTER
[Dear  ___] : Dear  [unfilled], [Consult Letter:] : I had the pleasure of evaluating your patient, [unfilled]. [Please see my note below.] : Please see my note below. [Sincerely,] : Sincerely, [FreeTextEntry3] : Jose De Jesus Brown DO [facility]\par Pulmonary Critical Care\par Director Pulmonary Division\par Medical Director Respiratory Therapy\par [facility]\par \par

## 2021-12-09 NOTE — HISTORY OF PRESENT ILLNESS
[TextBox_4] : remain smoke free\par has home 02- using with Astral\par has nocturnal NIV\par Dyspnea much improved\par less edema\par on Symbicort and Spiriva\par no fever, chill, chest pain \par \par

## 2021-12-09 NOTE — PROCEDURE
[FreeTextEntry1] : hospital records reviewed [facility] 10/21\par CXR nad\par D dimer negative, covid negative, no eos\par ABG with compensated hypercapnia\par ECHO; mild enlarged RV, mod PH\par \par 11/21 CT scan lung cancer screening: emphysema, small nodules, largest 5 mm\par PFts severe air flow obstruction, normal TLC, severely impaired DLCO

## 2021-12-30 PROCEDURE — 83605 ASSAY OF LACTIC ACID: CPT

## 2021-12-30 PROCEDURE — 93005 ELECTROCARDIOGRAM TRACING: CPT

## 2021-12-30 PROCEDURE — 80048 BASIC METABOLIC PNL TOTAL CA: CPT

## 2021-12-30 PROCEDURE — 85018 HEMOGLOBIN: CPT

## 2021-12-30 PROCEDURE — 84132 ASSAY OF SERUM POTASSIUM: CPT

## 2021-12-30 PROCEDURE — 86769 SARS-COV-2 COVID-19 ANTIBODY: CPT

## 2021-12-30 PROCEDURE — 85025 COMPLETE CBC W/AUTO DIFF WBC: CPT

## 2021-12-30 PROCEDURE — 84295 ASSAY OF SERUM SODIUM: CPT

## 2021-12-30 PROCEDURE — 99285 EMERGENCY DEPT VISIT HI MDM: CPT

## 2021-12-30 PROCEDURE — 0225U NFCT DS DNA&RNA 21 SARSCOV2: CPT

## 2021-12-30 PROCEDURE — 80053 COMPREHEN METABOLIC PANEL: CPT

## 2021-12-30 PROCEDURE — C8929: CPT

## 2021-12-30 PROCEDURE — 94640 AIRWAY INHALATION TREATMENT: CPT

## 2021-12-30 PROCEDURE — 85379 FIBRIN DEGRADATION QUANT: CPT

## 2021-12-30 PROCEDURE — 83880 ASSAY OF NATRIURETIC PEPTIDE: CPT

## 2021-12-30 PROCEDURE — 85014 HEMATOCRIT: CPT

## 2021-12-30 PROCEDURE — 86803 HEPATITIS C AB TEST: CPT

## 2021-12-30 PROCEDURE — 84484 ASSAY OF TROPONIN QUANT: CPT

## 2021-12-30 PROCEDURE — 82330 ASSAY OF CALCIUM: CPT

## 2021-12-30 PROCEDURE — 96374 THER/PROPH/DIAG INJ IV PUSH: CPT

## 2021-12-30 PROCEDURE — 36415 COLL VENOUS BLD VENIPUNCTURE: CPT

## 2021-12-30 PROCEDURE — 82947 ASSAY GLUCOSE BLOOD QUANT: CPT

## 2021-12-30 PROCEDURE — 71045 X-RAY EXAM CHEST 1 VIEW: CPT

## 2021-12-30 PROCEDURE — 82803 BLOOD GASES ANY COMBINATION: CPT

## 2021-12-30 PROCEDURE — 94660 CPAP INITIATION&MGMT: CPT

## 2021-12-30 PROCEDURE — 82435 ASSAY OF BLOOD CHLORIDE: CPT

## 2022-03-10 ENCOUNTER — APPOINTMENT (OUTPATIENT)
Dept: PULMONOLOGY | Facility: CLINIC | Age: 72
End: 2022-03-10
Payer: MEDICARE

## 2022-03-10 VITALS
SYSTOLIC BLOOD PRESSURE: 134 MMHG | DIASTOLIC BLOOD PRESSURE: 80 MMHG | BODY MASS INDEX: 48.83 KG/M2 | OXYGEN SATURATION: 95 % | RESPIRATION RATE: 18 BRPM | WEIGHT: 315 LBS | HEART RATE: 79 BPM

## 2022-03-10 PROCEDURE — 99215 OFFICE O/P EST HI 40 MIN: CPT

## 2022-03-10 RX ORDER — ALBUTEROL SULFATE 2.5 MG/3ML
(2.5 MG/3ML) SOLUTION RESPIRATORY (INHALATION)
Qty: 1 | Refills: 3 | Status: ACTIVE | COMMUNITY
Start: 2022-03-10 | End: 1900-01-01

## 2022-03-10 RX ORDER — PREDNISONE 10 MG/1
10 TABLET ORAL
Qty: 30 | Refills: 6 | Status: DISCONTINUED | COMMUNITY
Start: 2021-12-09 | End: 2022-03-10

## 2022-03-10 NOTE — CONSULT LETTER
[Dear  ___] : Dear  [unfilled], [Consult Letter:] : I had the pleasure of evaluating your patient, [unfilled]. [Please see my note below.] : Please see my note below. [Sincerely,] : Sincerely, [FreeTextEntry3] : Jose De Jesus Brown DO [facility]\par Pulmonary Critical Care\par Director Pulmonary Division\par Medical Director Respiratory Therapy\par [facility]\par \par

## 2022-03-10 NOTE — DISCUSSION/SUMMARY
[FreeTextEntry1] :  Severe COPD /asthma /Obesity/OHS - cor pulmonale by echocardiogram with PH\par Remains smoke free, dyspnea improved\par Complaint with  Nocturnal NIV -Astral, - with 02\par continued Smoking cessation stressed\par Change to Trelegy daily and technique reviewed\par needs home nebulizer\par Low dose rad CT scan 11/21 reviewed, small nodules- f/u 1 yr, sebaceous cyst discussed with pt, Derm f/u\par Vaccinations need strongly encouraged, refuses Covid\par Weight loss\par baseline Cardiology eval\par alpha 1 , asthma profile ordered\par 12  weeks or sooner if needed, action plan reviewed\par

## 2022-03-10 NOTE — REASON FOR VISIT
Statement Selected [Follow-Up] : a follow-up visit [Asthma] : asthma [COPD] : COPD [Shortness of Breath] : shortness of breath

## 2022-03-10 NOTE — PROCEDURE
[FreeTextEntry1] : hospital records reviewed [facility] 10/21\par CXR nad\par D dimer negative, covid negative, no eos\par ABG with compensated hypercapnia\par ECHO; mild enlarged RV, mod PH\par \par 11/21 CT scan lung cancer screening: emphysema, small nodules, largest 5 mm\par PFts severe air flow obstruction, normal TLC, severely impaired DLCO

## 2022-03-10 NOTE — HISTORY OF PRESENT ILLNESS
[TextBox_4] : remain smoke free\par has home 02- using with Astral\par has nocturnal NIV\par Dyspnea much improved\par less edema\par on Symbicort and Spiriva, using intermittently\par no fever, chill, chest pain \par \par

## 2022-06-08 ENCOUNTER — APPOINTMENT (OUTPATIENT)
Dept: PULMONOLOGY | Facility: CLINIC | Age: 72
End: 2022-06-08
Payer: MEDICARE

## 2022-06-08 VITALS — WEIGHT: 315 LBS | BODY MASS INDEX: 50.18 KG/M2

## 2022-06-08 VITALS
SYSTOLIC BLOOD PRESSURE: 160 MMHG | HEART RATE: 86 BPM | DIASTOLIC BLOOD PRESSURE: 80 MMHG | OXYGEN SATURATION: 96 % | RESPIRATION RATE: 18 BRPM

## 2022-06-08 DIAGNOSIS — E66.01 MORBID (SEVERE) OBESITY DUE TO EXCESS CALORIES: ICD-10-CM

## 2022-06-08 PROCEDURE — 99214 OFFICE O/P EST MOD 30 MIN: CPT

## 2022-06-08 RX ORDER — TIOTROPIUM BROMIDE 18 UG/1
18 CAPSULE ORAL; RESPIRATORY (INHALATION)
Refills: 0 | Status: DISCONTINUED | COMMUNITY
End: 2022-06-08

## 2022-06-08 RX ORDER — BUDESONIDE AND FORMOTEROL FUMARATE DIHYDRATE 160; 4.5 UG/1; UG/1
160-4.5 AEROSOL RESPIRATORY (INHALATION) TWICE DAILY
Qty: 1 | Refills: 5 | Status: DISCONTINUED | COMMUNITY
Start: 2021-12-13 | End: 2022-06-08

## 2022-06-08 NOTE — CONSULT LETTER
[Dear  ___] : Dear  [unfilled], [Consult Letter:] : I had the pleasure of evaluating your patient, [unfilled]. [Please see my note below.] : Please see my note below. [Sincerely,] : Sincerely, [FreeTextEntry3] : Jose De Jesus Brown DO [facility]\par Pulmonary Critical Care\par Director Pulmonary Division\par Medical Director Respiratory Therapy\par [facility]\par \par

## 2022-06-08 NOTE — PROCEDURE
[FreeTextEntry1] : hospital records reviewed [facility] 10/21\par CXR nad\par D dimer negative, covid negative, no eos\par ABG with compensated hypercapnia\par ECHO; mild enlarged RV, mod PH\par \par 11/21 CT scan lung cancer screening: emphysema, small nodules, largest 5 mm\par PFts severe air flow obstruction, normal TLC, severely impaired DLCO

## 2022-06-08 NOTE — DISCUSSION/SUMMARY
[FreeTextEntry1] :  Severe COPD /asthma /Obesity/OHS - cor pulmonale by echocardiogram with PH\par Remains smoke free, dyspnea improved\par Compliant with  Nocturnal NIV -Astral, - with 02\par continued Smoking cessation stressed\par continue  Trelegy daily and technique reviewed\par Low dose rad CT scan 11/21 reviewed, small nodules- f/u 1 yr, sebaceous cyst followed by dermatology\par Vaccinations need strongly encouraged, refuses Covid\par Weight loss, Pulmonary rehab discussed\par baseline Cardiology eval\par alpha 1 , asthma profile ordered\par 4 months  or sooner if needed, action plan reviewed\par

## 2022-06-15 ENCOUNTER — APPOINTMENT (OUTPATIENT)
Dept: DERMATOLOGY | Facility: CLINIC | Age: 72
End: 2022-06-15
Payer: MEDICARE

## 2022-06-15 ENCOUNTER — RESULT REVIEW (OUTPATIENT)
Age: 72
End: 2022-06-15

## 2022-06-15 PROCEDURE — 17000 DESTRUCT PREMALG LESION: CPT | Mod: 59

## 2022-06-15 PROCEDURE — 99214 OFFICE O/P EST MOD 30 MIN: CPT | Mod: 25

## 2022-06-15 PROCEDURE — 11102 TANGNTL BX SKIN SINGLE LES: CPT

## 2022-06-27 ENCOUNTER — OFFICE (OUTPATIENT)
Dept: URBAN - METROPOLITAN AREA CLINIC 104 | Facility: CLINIC | Age: 72
Setting detail: OPHTHALMOLOGY
End: 2022-06-27
Payer: MEDICARE

## 2022-06-27 DIAGNOSIS — H01.005: ICD-10-CM

## 2022-06-27 DIAGNOSIS — H25.13: ICD-10-CM

## 2022-06-27 DIAGNOSIS — H01.001: ICD-10-CM

## 2022-06-27 DIAGNOSIS — H01.002: ICD-10-CM

## 2022-06-27 DIAGNOSIS — H01.004: ICD-10-CM

## 2022-06-27 DIAGNOSIS — H43.393: ICD-10-CM

## 2022-06-27 DIAGNOSIS — H25.12: ICD-10-CM

## 2022-06-27 DIAGNOSIS — H25.11: ICD-10-CM

## 2022-06-27 PROCEDURE — 92136 OPHTHALMIC BIOMETRY: CPT | Performed by: OPHTHALMOLOGY

## 2022-06-27 PROCEDURE — 99204 OFFICE O/P NEW MOD 45 MIN: CPT | Performed by: OPHTHALMOLOGY

## 2022-06-27 ASSESSMENT — KERATOMETRY
OD_CYLPOWER_DEGREES: 1.02
OD_K1K2_AVERAGE: 46.37
OS_CYLAXISANGLE_DEGREES: 027
OS_K1POWER_DIOPTERS: 45.61
OS_K2POWER_DIOPTERS: 46.23
OD_CYLAXISANGLE_DEGREES: 100
OD_AXISANGLE_DEGREES: 120
OD_K1POWER_DIOPTERS: 45.86
OD_AXISANGLE2_DEGREES: 100
OS_AXISANGLE2_DEGREES: 027
OS_CYLPOWER_DEGREES: 0.31
OS_AXISANGLE_DEGREES: 027
OS_K2POWER_DIOPTERS: 45.98
OS_AXISANGLE_DEGREES: 005
OD_K2POWER_DIOPTERS: 46.42
OD_AXISANGLE_DEGREES: 100
OS_K1K2_AVERAGE: 45.83
OD_K2POWER_DIOPTERS: 46.88
OD_K1POWER_DIOPTERS: 46.17
OS_K1POWER_DIOPTERS: 45.67

## 2022-06-27 ASSESSMENT — SPHEQUIV_DERIVED
OD_SPHEQUIV: -3
OS_SPHEQUIV: -2.75
OD_SPHEQUIV: -3.625
OS_SPHEQUIV: -2.75

## 2022-06-27 ASSESSMENT — REFRACTION_CURRENTRX
OD_AXIS: 94
OS_CYLINDER: -0.75
OD_OVR_VA: 20/
OD_SPHERE: -0.50
OS_SPHERE: -1.50
OS_OVR_VA: 20/
OD_CYLINDER: -1.75
OS_AXIS: 94

## 2022-06-27 ASSESSMENT — REFRACTION_MANIFEST
OD_AXIS: 80
OD_VA1: 20/100
OD_SPHERE: -2.50
OS_CYLINDER: -2.00
OS_AXIS: 107
OS_SPHERE: -1.75
OD_CYLINDER: -1.00
OD_VA2: 20/30(J2)
OS_VA1: 20/70
OS_ADD: +2.50
OD_ADD: +2.50
OS_VA2: 20/30(J2)

## 2022-06-27 ASSESSMENT — TONOMETRY
OS_IOP_MMHG: 18
OD_IOP_MMHG: 18

## 2022-06-27 ASSESSMENT — REFRACTION_AUTOREFRACTION
OS_AXIS: 107
OD_SPHERE: -3.00
OS_SPHERE: -1.75
OS_CYLINDER: -2.00
OD_CYLINDER: -1.25
OD_AXIS: 79

## 2022-06-27 ASSESSMENT — AXIALLENGTH_DERIVED
OD_AL: 23.71
OS_AL: 23.81
OS_AL: 23.81
OD_AL: 23.96

## 2022-06-27 ASSESSMENT — LID EXAM ASSESSMENTS
OD_BLEPHARITIS: RLL RUL T 1+
OS_BLEPHARITIS: LLL LUL T 1+

## 2022-06-27 ASSESSMENT — VISUAL ACUITY
OS_BCVA: 20/100
OD_BCVA: 20/70

## 2022-06-27 ASSESSMENT — CONFRONTATIONAL VISUAL FIELD TEST (CVF)
OS_FINDINGS: FULL
OD_FINDINGS: FULL

## 2022-07-11 ENCOUNTER — OFFICE (OUTPATIENT)
Dept: URBAN - METROPOLITAN AREA CLINIC 104 | Facility: CLINIC | Age: 72
Setting detail: OPHTHALMOLOGY
End: 2022-07-11
Payer: MEDICARE

## 2022-07-11 DIAGNOSIS — H25.12: ICD-10-CM

## 2022-07-11 PROCEDURE — 99211 OFF/OP EST MAY X REQ PHY/QHP: CPT | Performed by: SPECIALIST

## 2022-07-11 ASSESSMENT — REFRACTION_MANIFEST
OS_VA2: 20/30(J2)
OS_ADD: +2.50
OS_AXIS: 107
OS_CYLINDER: -2.00
OD_ADD: +2.50
OD_CYLINDER: -1.00
OD_AXIS: 80
OD_SPHERE: -2.50
OD_VA2: 20/30(J2)
OS_VA1: 20/70
OD_VA1: 20/100
OS_SPHERE: -1.75

## 2022-07-11 ASSESSMENT — REFRACTION_AUTOREFRACTION
OD_CYLINDER: -1.25
OD_SPHERE: -3.00
OS_CYLINDER: -2.00
OD_AXIS: 79
OS_SPHERE: -1.75
OS_AXIS: 107

## 2022-07-11 ASSESSMENT — REFRACTION_CURRENTRX
OD_AXIS: 94
OD_SPHERE: -0.50
OS_AXIS: 94
OS_OVR_VA: 20/
OS_CYLINDER: -0.75
OS_SPHERE: -1.50
OD_CYLINDER: -1.75
OD_OVR_VA: 20/

## 2022-07-11 ASSESSMENT — SPHEQUIV_DERIVED
OS_SPHEQUIV: -2.75
OD_SPHEQUIV: -3
OD_SPHEQUIV: -3.625
OS_SPHEQUIV: -2.75

## 2022-07-11 ASSESSMENT — KERATOMETRY
OS_AXISANGLE_DEGREES: 005
OD_AXISANGLE_DEGREES: 120
OS_K2POWER_DIOPTERS: 46.23
OD_K2POWER_DIOPTERS: 46.42
OS_K1POWER_DIOPTERS: 45.61
OD_K1POWER_DIOPTERS: 46.17

## 2022-07-11 ASSESSMENT — VISUAL ACUITY
OS_BCVA: 20/100
OD_BCVA: 20/70

## 2022-07-11 ASSESSMENT — AXIALLENGTH_DERIVED
OD_AL: 23.98
OS_AL: 23.78
OD_AL: 23.73
OS_AL: 23.78

## 2022-07-14 ENCOUNTER — RX ONLY (RX ONLY)
Age: 72
End: 2022-07-14

## 2022-07-14 ENCOUNTER — AMBULATORY SURGERY CENTER (OUTPATIENT)
Dept: URBAN - METROPOLITAN AREA SURGERY 27 | Facility: SURGERY | Age: 72
Setting detail: OPHTHALMOLOGY
End: 2022-07-14
Payer: MEDICARE

## 2022-07-14 DIAGNOSIS — H25.12: ICD-10-CM

## 2022-07-14 PROCEDURE — 66984 XCAPSL CTRC RMVL W/O ECP: CPT | Performed by: OPHTHALMOLOGY

## 2022-07-15 ENCOUNTER — OFFICE (OUTPATIENT)
Dept: URBAN - METROPOLITAN AREA CLINIC 104 | Facility: CLINIC | Age: 72
Setting detail: OPHTHALMOLOGY
End: 2022-07-15
Payer: MEDICARE

## 2022-07-15 DIAGNOSIS — Z96.1: ICD-10-CM

## 2022-07-15 DIAGNOSIS — H25.11: ICD-10-CM

## 2022-07-15 PROCEDURE — 99024 POSTOP FOLLOW-UP VISIT: CPT | Performed by: OPHTHALMOLOGY

## 2022-07-15 PROCEDURE — 92136 OPHTHALMIC BIOMETRY: CPT | Performed by: OPHTHALMOLOGY

## 2022-07-15 ASSESSMENT — VISUAL ACUITY
OS_BCVA: 20/100
OD_BCVA: 20/40-2

## 2022-07-15 ASSESSMENT — CONFRONTATIONAL VISUAL FIELD TEST (CVF)
OS_FINDINGS: FULL
OD_FINDINGS: FULL

## 2022-07-15 ASSESSMENT — TONOMETRY: OS_IOP_MMHG: 20

## 2022-07-15 ASSESSMENT — LID EXAM ASSESSMENTS
OD_BLEPHARITIS: RLL RUL T 1+
OS_BLEPHARITIS: LLL LUL T 1+

## 2022-07-15 ASSESSMENT — CORNEAL EDEMA - FOLDS/STRIAE: OS_FOLDSSTRIAE: 2+

## 2022-07-18 ENCOUNTER — OFFICE (OUTPATIENT)
Dept: URBAN - METROPOLITAN AREA CLINIC 104 | Facility: CLINIC | Age: 72
Setting detail: OPHTHALMOLOGY
End: 2022-07-18
Payer: MEDICARE

## 2022-07-18 DIAGNOSIS — Z96.1: ICD-10-CM

## 2022-07-18 DIAGNOSIS — H25.11: ICD-10-CM

## 2022-07-18 PROCEDURE — 99024 POSTOP FOLLOW-UP VISIT: CPT | Performed by: OPHTHALMOLOGY

## 2022-07-18 PROCEDURE — 99211 OFF/OP EST MAY X REQ PHY/QHP: CPT | Performed by: OPHTHALMOLOGY

## 2022-07-18 ASSESSMENT — KERATOMETRY
OD_AXISANGLE_DEGREES: 114
OD_K1POWER_DIOPTERS: 46.11
OS_K1POWER_DIOPTERS: 45.73
OD_K2POWER_DIOPTERS: 46.49
OS_K2POWER_DIOPTERS: 45.86
OS_AXISANGLE_DEGREES: 008

## 2022-07-18 ASSESSMENT — LID EXAM ASSESSMENTS
OS_BLEPHARITIS: LLL LUL T 1+
OD_BLEPHARITIS: RLL RUL T 1+

## 2022-07-18 ASSESSMENT — VISUAL ACUITY
OS_BCVA: 20/80
OS_BCVA: 20/100
OD_BCVA: 20/40-2
OD_BCVA: 20/30-1

## 2022-07-18 ASSESSMENT — CONFRONTATIONAL VISUAL FIELD TEST (CVF)
OD_FINDINGS: FULL
OS_FINDINGS: FULL

## 2022-07-18 ASSESSMENT — REFRACTION_AUTOREFRACTION
OD_SPHERE: UNABLE
OS_SPHERE: +0.75
OS_AXIS: 107
OS_CYLINDER: -0.50

## 2022-07-18 ASSESSMENT — CORNEAL EDEMA - FOLDS/STRIAE: OS_FOLDSSTRIAE: ABSENT

## 2022-07-18 ASSESSMENT — SPHEQUIV_DERIVED: OS_SPHEQUIV: 0.5

## 2022-07-18 ASSESSMENT — AXIALLENGTH_DERIVED: OS_AL: 22.6

## 2022-07-18 ASSESSMENT — TONOMETRY: OS_IOP_MMHG: 18

## 2022-07-21 ENCOUNTER — ASC (OUTPATIENT)
Dept: URBAN - METROPOLITAN AREA SURGERY 8 | Facility: SURGERY | Age: 72
Setting detail: OPHTHALMOLOGY
End: 2022-07-21
Payer: MEDICARE

## 2022-07-21 ENCOUNTER — RX ONLY (RX ONLY)
Age: 72
End: 2022-07-21

## 2022-07-21 DIAGNOSIS — H25.11: ICD-10-CM

## 2022-07-21 PROCEDURE — 66984 XCAPSL CTRC RMVL W/O ECP: CPT | Performed by: OPHTHALMOLOGY

## 2022-07-22 ENCOUNTER — OFFICE (OUTPATIENT)
Dept: URBAN - METROPOLITAN AREA CLINIC 104 | Facility: CLINIC | Age: 72
Setting detail: OPHTHALMOLOGY
End: 2022-07-22
Payer: MEDICARE

## 2022-07-22 DIAGNOSIS — Z96.1: ICD-10-CM

## 2022-07-22 PROCEDURE — 99024 POSTOP FOLLOW-UP VISIT: CPT | Performed by: OPHTHALMOLOGY

## 2022-07-22 ASSESSMENT — REFRACTION_AUTOREFRACTION
OS_AXIS: 104
OD_SPHERE: UNABLE
OS_CYLINDER: -1.00
OS_SPHERE: +1.00

## 2022-07-22 ASSESSMENT — KERATOMETRY
OD_K1POWER_DIOPTERS: 45.00
OD_K2POWER_DIOPTERS: 45.86
OS_K2POWER_DIOPTERS: 45.98
OD_AXISANGLE_DEGREES: 068
OS_AXISANGLE_DEGREES: 004
OS_K1POWER_DIOPTERS: 45.67

## 2022-07-22 ASSESSMENT — VISUAL ACUITY
OS_BCVA: 20/HM
OD_BCVA: 20/20

## 2022-07-22 ASSESSMENT — CONFRONTATIONAL VISUAL FIELD TEST (CVF)
OS_FINDINGS: FULL
OD_FINDINGS: FULL

## 2022-07-22 ASSESSMENT — TONOMETRY: OD_IOP_MMHG: 16

## 2022-07-22 ASSESSMENT — LID EXAM ASSESSMENTS
OS_BLEPHARITIS: LLL LUL T 1+
OD_BLEPHARITIS: RLL RUL T 1+

## 2022-07-22 ASSESSMENT — AXIALLENGTH_DERIVED: OS_AL: 22.59

## 2022-07-22 ASSESSMENT — SPHEQUIV_DERIVED: OS_SPHEQUIV: 0.5

## 2022-07-22 ASSESSMENT — CORNEAL EDEMA - FOLDS/STRIAE: OS_FOLDSSTRIAE: ABSENT

## 2022-07-25 ENCOUNTER — OFFICE (OUTPATIENT)
Dept: URBAN - METROPOLITAN AREA CLINIC 104 | Facility: CLINIC | Age: 72
Setting detail: OPHTHALMOLOGY
End: 2022-07-25
Payer: MEDICARE

## 2022-07-25 DIAGNOSIS — H27.01: ICD-10-CM

## 2022-07-25 DIAGNOSIS — Z96.1: ICD-10-CM

## 2022-07-25 PROCEDURE — 99024 POSTOP FOLLOW-UP VISIT: CPT | Performed by: OPHTHALMOLOGY

## 2022-07-25 ASSESSMENT — REFRACTION_AUTOREFRACTION
OS_AXIS: 095
OS_CYLINDER: -2.00
OS_SPHERE: +2.25
OD_SPHERE: +10.00
OD_AXIS: 072
OD_CYLINDER: -0.75

## 2022-07-25 ASSESSMENT — CONFRONTATIONAL VISUAL FIELD TEST (CVF)
OS_FINDINGS: FULL
OD_FINDINGS: FULL

## 2022-07-25 ASSESSMENT — KERATOMETRY
OS_AXISANGLE_DEGREES: 102
OS_K1POWER_DIOPTERS: 45.30
OD_K2POWER_DIOPTERS: 45.92
OD_K1POWER_DIOPTERS: 45.24
OD_AXISANGLE_DEGREES: 059
OS_K2POWER_DIOPTERS: 45.92

## 2022-07-25 ASSESSMENT — SPHEQUIV_DERIVED
OD_SPHEQUIV: 9.625
OS_SPHEQUIV: 1.25

## 2022-07-25 ASSESSMENT — VISUAL ACUITY
OD_BCVA: 20/20
OS_BCVA: 20/HM

## 2022-07-25 ASSESSMENT — CORNEAL EDEMA - MICROCYSTIC EPITHELIAL EDEMA (MCE): OD_MCE: T

## 2022-07-25 ASSESSMENT — AXIALLENGTH_DERIVED
OS_AL: 22.394
OD_AL: 19.8

## 2022-07-25 ASSESSMENT — LID EXAM ASSESSMENTS
OS_BLEPHARITIS: LLL LUL T 1+
OD_BLEPHARITIS: RLL RUL T 1+

## 2022-07-25 ASSESSMENT — TONOMETRY
OS_IOP_MMHG: 19
OD_IOP_MMHG: 19

## 2022-07-25 ASSESSMENT — CORNEAL EDEMA - FOLDS/STRIAE
OS_FOLDSSTRIAE: ABSENT
OD_FOLDSSTRIAE: 1+

## 2022-08-01 ENCOUNTER — OFFICE (OUTPATIENT)
Dept: URBAN - METROPOLITAN AREA CLINIC 104 | Facility: CLINIC | Age: 72
Setting detail: OPHTHALMOLOGY
End: 2022-08-01
Payer: MEDICARE

## 2022-08-01 DIAGNOSIS — H27.01: ICD-10-CM

## 2022-08-01 PROCEDURE — 99024 POSTOP FOLLOW-UP VISIT: CPT | Performed by: OPHTHALMOLOGY

## 2022-08-01 ASSESSMENT — CORNEAL EDEMA - FOLDS/STRIAE
OD_FOLDSSTRIAE: T
OS_FOLDSSTRIAE: ABSENT

## 2022-08-01 ASSESSMENT — CONFRONTATIONAL VISUAL FIELD TEST (CVF)
OD_FINDINGS: FULL
OS_FINDINGS: FULL

## 2022-08-01 ASSESSMENT — LID EXAM ASSESSMENTS
OS_BLEPHARITIS: LLL LUL T 1+
OD_BLEPHARITIS: RLL RUL T 1+

## 2022-08-02 ASSESSMENT — SPHEQUIV_DERIVED
OD_SPHEQUIV: 9.5
OS_SPHEQUIV: 0.5

## 2022-08-02 ASSESSMENT — KERATOMETRY
OD_AXISANGLE_DEGREES: 100
OS_K1POWER_DIOPTERS: 45.67
OS_AXISANGLE_DEGREES: 027
OD_K2POWER_DIOPTERS: 46.88
OD_K1POWER_DIOPTERS: 45.86
OS_K2POWER_DIOPTERS: 45.98

## 2022-08-02 ASSESSMENT — REFRACTION_AUTOREFRACTION
OD_AXIS: 028
OD_SPHERE: +9.75
OS_AXIS: 115
OS_CYLINDER: -0.50
OD_CYLINDER: -0.50
OS_SPHERE: +0.75

## 2022-08-02 ASSESSMENT — AXIALLENGTH_DERIVED
OD_AL: 19.63
OS_AL: 22.59

## 2022-08-02 ASSESSMENT — VISUAL ACUITY
OS_BCVA: 20/CF
OD_BCVA: 20/20-2

## 2022-08-03 ENCOUNTER — OFFICE (OUTPATIENT)
Dept: URBAN - METROPOLITAN AREA CLINIC 104 | Facility: CLINIC | Age: 72
Setting detail: OPHTHALMOLOGY
End: 2022-08-03
Payer: MEDICARE

## 2022-08-03 DIAGNOSIS — H27.01: ICD-10-CM

## 2022-08-03 PROCEDURE — N/C NO CHARGE: Performed by: OPHTHALMOLOGY

## 2022-08-03 ASSESSMENT — AXIALLENGTH_DERIVED
OD_AL: 19.63
OS_AL: 22.59

## 2022-08-03 ASSESSMENT — KERATOMETRY
OD_K1POWER_DIOPTERS: 45.86
OS_AXISANGLE_DEGREES: 027
OD_K2POWER_DIOPTERS: 46.88
OS_K2POWER_DIOPTERS: 45.98
OS_K1POWER_DIOPTERS: 45.67
OD_AXISANGLE_DEGREES: 100

## 2022-08-03 ASSESSMENT — REFRACTION_AUTOREFRACTION
OS_SPHERE: +0.75
OS_CYLINDER: -0.50
OD_SPHERE: +9.75
OD_CYLINDER: -0.50
OD_AXIS: 028
OS_AXIS: 115

## 2022-08-03 ASSESSMENT — SPHEQUIV_DERIVED
OD_SPHEQUIV: 9.5
OS_SPHEQUIV: 0.5

## 2022-08-03 ASSESSMENT — VISUAL ACUITY
OD_BCVA: 20/20-2
OS_BCVA: 20/CF

## 2022-08-08 ENCOUNTER — OFFICE (OUTPATIENT)
Dept: URBAN - METROPOLITAN AREA CLINIC 104 | Facility: CLINIC | Age: 72
Setting detail: OPHTHALMOLOGY
End: 2022-08-08
Payer: MEDICARE

## 2022-08-08 DIAGNOSIS — Z20.822: ICD-10-CM

## 2022-08-08 DIAGNOSIS — Z01.812: ICD-10-CM

## 2022-08-08 PROCEDURE — 99211 OFF/OP EST MAY X REQ PHY/QHP: CPT | Performed by: SPECIALIST

## 2022-08-08 ASSESSMENT — VISUAL ACUITY
OD_BCVA: 20/20-2
OS_BCVA: 20/CF

## 2022-08-08 ASSESSMENT — KERATOMETRY
OD_AXISANGLE_DEGREES: 100
OD_K1POWER_DIOPTERS: 45.86
OS_AXISANGLE_DEGREES: 027
OS_K1POWER_DIOPTERS: 45.67
OD_K2POWER_DIOPTERS: 46.88
OS_K2POWER_DIOPTERS: 45.98

## 2022-08-08 ASSESSMENT — AXIALLENGTH_DERIVED
OD_AL: 19.63
OS_AL: 22.59

## 2022-08-08 ASSESSMENT — SPHEQUIV_DERIVED
OS_SPHEQUIV: 0.5
OD_SPHEQUIV: 9.5

## 2022-08-08 ASSESSMENT — REFRACTION_AUTOREFRACTION
OD_CYLINDER: -0.50
OS_CYLINDER: -0.50
OS_SPHERE: +0.75
OD_AXIS: 028
OS_AXIS: 115
OD_SPHERE: +9.75

## 2022-08-12 ENCOUNTER — ASC (OUTPATIENT)
Dept: URBAN - METROPOLITAN AREA SURGERY 8 | Facility: SURGERY | Age: 72
Setting detail: OPHTHALMOLOGY
End: 2022-08-12
Payer: MEDICARE

## 2022-08-12 DIAGNOSIS — H27.01: ICD-10-CM

## 2022-08-12 PROCEDURE — 66985 INSERT LENS PROSTHESIS: CPT | Performed by: OPHTHALMOLOGY

## 2022-08-12 PROCEDURE — 67036 REMOVAL OF INNER EYE FLUID: CPT | Performed by: OPHTHALMOLOGY

## 2022-08-13 ENCOUNTER — OFFICE (OUTPATIENT)
Dept: URBAN - METROPOLITAN AREA CLINIC 104 | Facility: CLINIC | Age: 72
Setting detail: OPHTHALMOLOGY
End: 2022-08-13
Payer: MEDICARE

## 2022-08-13 DIAGNOSIS — Z96.1: ICD-10-CM

## 2022-08-13 PROCEDURE — 99024 POSTOP FOLLOW-UP VISIT: CPT | Performed by: OPTOMETRIST

## 2022-08-13 ASSESSMENT — SPHEQUIV_DERIVED
OD_SPHEQUIV: 9.5
OS_SPHEQUIV: 0.5

## 2022-08-13 ASSESSMENT — CORNEAL EDEMA - FOLDS/STRIAE
OS_FOLDSSTRIAE: ABSENT
OD_FOLDSSTRIAE: T

## 2022-08-13 ASSESSMENT — KERATOMETRY
OD_K2POWER_DIOPTERS: 46.88
OD_AXISANGLE_DEGREES: 100
OS_AXISANGLE_DEGREES: 027
OD_K1POWER_DIOPTERS: 45.86
OS_K2POWER_DIOPTERS: 45.98
OS_K1POWER_DIOPTERS: 45.67

## 2022-08-13 ASSESSMENT — LID EXAM ASSESSMENTS
OS_BLEPHARITIS: LLL LUL T 1+
OD_BLEPHARITIS: RLL RUL T 1+

## 2022-08-13 ASSESSMENT — REFRACTION_AUTOREFRACTION
OD_CYLINDER: -0.50
OS_AXIS: 115
OD_AXIS: 028
OD_SPHERE: +9.75
OS_SPHERE: +0.75
OS_CYLINDER: -0.50

## 2022-08-13 ASSESSMENT — AXIALLENGTH_DERIVED
OD_AL: 19.63
OS_AL: 22.59

## 2022-08-13 ASSESSMENT — VISUAL ACUITY
OS_BCVA: 20/40-2
OD_BCVA: 20/20-2

## 2022-08-13 ASSESSMENT — TONOMETRY: OD_IOP_MMHG: 18

## 2022-08-19 ENCOUNTER — OFFICE (OUTPATIENT)
Dept: URBAN - METROPOLITAN AREA CLINIC 104 | Facility: CLINIC | Age: 72
Setting detail: OPHTHALMOLOGY
End: 2022-08-19
Payer: MEDICARE

## 2022-08-19 DIAGNOSIS — Z96.1: ICD-10-CM

## 2022-08-19 PROCEDURE — 99024 POSTOP FOLLOW-UP VISIT: CPT | Performed by: OPHTHALMOLOGY

## 2022-08-19 ASSESSMENT — SPHEQUIV_DERIVED
OS_SPHEQUIV: 0.25
OD_SPHEQUIV: -1.125

## 2022-08-19 ASSESSMENT — CONFRONTATIONAL VISUAL FIELD TEST (CVF)
OD_FINDINGS: FULL
OS_FINDINGS: FULL

## 2022-08-19 ASSESSMENT — VISUAL ACUITY
OS_BCVA: 20/40
OD_BCVA: 20/25

## 2022-08-19 ASSESSMENT — REFRACTION_AUTOREFRACTION
OD_SPHERE: -0.75
OD_CYLINDER: -0.75
OS_CYLINDER: -0.50
OD_AXIS: 30
OS_AXIS: 110
OS_SPHERE: +0.50

## 2022-08-19 ASSESSMENT — LID EXAM ASSESSMENTS
OD_BLEPHARITIS: RLL RUL T 1+
OS_BLEPHARITIS: LLL LUL T 1+

## 2022-08-19 ASSESSMENT — KERATOMETRY
OS_K2POWER_DIOPTERS: 46.23
OD_AXISANGLE_DEGREES: 110
OD_K1POWER_DIOPTERS: 45.49
OS_AXISANGLE_DEGREES: 6
OD_K2POWER_DIOPTERS: 46.55
OS_K1POWER_DIOPTERS: 45.49

## 2022-08-19 ASSESSMENT — TONOMETRY: OD_IOP_MMHG: 20

## 2022-08-19 ASSESSMENT — CORNEAL EDEMA - FOLDS/STRIAE
OD_FOLDSSTRIAE: T
OS_FOLDSSTRIAE: ABSENT

## 2022-08-19 ASSESSMENT — AXIALLENGTH_DERIVED
OD_AL: 23.11
OS_AL: 22.6655

## 2022-09-07 ENCOUNTER — APPOINTMENT (OUTPATIENT)
Dept: DERMATOLOGY | Facility: CLINIC | Age: 72
End: 2022-09-07

## 2022-09-07 PROCEDURE — 99214 OFFICE O/P EST MOD 30 MIN: CPT

## 2022-09-21 ENCOUNTER — APPOINTMENT (OUTPATIENT)
Dept: DERMATOLOGY | Facility: CLINIC | Age: 72
End: 2022-09-21

## 2022-09-21 PROCEDURE — 99213 OFFICE O/P EST LOW 20 MIN: CPT

## 2022-10-03 ENCOUNTER — OFFICE (OUTPATIENT)
Dept: URBAN - METROPOLITAN AREA CLINIC 104 | Facility: CLINIC | Age: 72
Setting detail: OPHTHALMOLOGY
End: 2022-10-03
Payer: MEDICARE

## 2022-10-03 DIAGNOSIS — H26.491: ICD-10-CM

## 2022-10-03 DIAGNOSIS — Z96.1: ICD-10-CM

## 2022-10-03 PROBLEM — H25.11 CATARACT SENILE NUCLEAR SCLEROSIS; RIGHT EYE: Status: ACTIVE | Noted: 2022-07-15

## 2022-10-03 PROBLEM — H43.393 VITREOUS FLOATERS; BOTH EYES: Status: ACTIVE | Noted: 2022-06-27

## 2022-10-03 PROBLEM — H01.004 BLEPHARITIS; RIGHT UPPER LID, RIGHT LOWER LID, LEFT UPPER LID, LEFT LOWER LID: Status: ACTIVE | Noted: 2022-06-27

## 2022-10-03 PROBLEM — H01.005 BLEPHARITIS; RIGHT UPPER LID, RIGHT LOWER LID, LEFT UPPER LID, LEFT LOWER LID: Status: ACTIVE | Noted: 2022-06-27

## 2022-10-03 PROBLEM — H01.001 BLEPHARITIS; RIGHT UPPER LID, RIGHT LOWER LID, LEFT UPPER LID, LEFT LOWER LID: Status: ACTIVE | Noted: 2022-06-27

## 2022-10-03 PROBLEM — H01.002 BLEPHARITIS; RIGHT UPPER LID, RIGHT LOWER LID, LEFT UPPER LID, LEFT LOWER LID: Status: ACTIVE | Noted: 2022-06-27

## 2022-10-03 PROCEDURE — 99024 POSTOP FOLLOW-UP VISIT: CPT | Performed by: OPHTHALMOLOGY

## 2022-10-03 ASSESSMENT — AXIALLENGTH_DERIVED
OS_AL: 22.59
OS_AL: 22.77
OD_AL: 23

## 2022-10-03 ASSESSMENT — REFRACTION_MANIFEST
OD_SPHERE: -0.75
OS_CYLINDER: -0.25
OU_VA: 20/20
OD_VA2: 20/20(J1+)
OD_ADD: +2.75
OS_VA2: 20/20(J1+)
OS_VA1: 20/20
OS_ADD: +2.75
OS_AXIS: 100
OD_CYLINDER: SPH
OS_SPHERE: +0.25
OD_VA1: 20/20

## 2022-10-03 ASSESSMENT — REFRACTION_AUTOREFRACTION
OD_SPHERE: -1.00
OD_CYLINDER: -0.50
OS_SPHERE: +1.00
OD_AXIS: 046
OS_CYLINDER: -0.75
OS_AXIS: 100

## 2022-10-03 ASSESSMENT — SPHEQUIV_DERIVED
OS_SPHEQUIV: 0.125
OD_SPHEQUIV: -1.25
OS_SPHEQUIV: 0.625

## 2022-10-03 ASSESSMENT — CONFRONTATIONAL VISUAL FIELD TEST (CVF)
OD_FINDINGS: FULL
OS_FINDINGS: FULL

## 2022-10-03 ASSESSMENT — VISUAL ACUITY
OS_BCVA: 20/30-2
OD_BCVA: 20/20-2

## 2022-10-03 ASSESSMENT — CORNEAL EDEMA - FOLDS/STRIAE
OD_FOLDSSTRIAE: T
OS_FOLDSSTRIAE: ABSENT

## 2022-10-03 ASSESSMENT — KERATOMETRY
OD_K1POWER_DIOPTERS: 45.92
OD_K2POWER_DIOPTERS: 47.01
OS_K2POWER_DIOPTERS: 46.17
OS_K1POWER_DIOPTERS: 45.18
OS_AXISANGLE_DEGREES: 011
OD_AXISANGLE_DEGREES: 107

## 2022-10-03 ASSESSMENT — LID EXAM ASSESSMENTS
OD_BLEPHARITIS: RLL RUL T 1+
OS_BLEPHARITIS: LLL LUL T 1+

## 2022-10-03 ASSESSMENT — TONOMETRY
OD_IOP_MMHG: 18
OS_IOP_MMHG: 20

## 2022-10-07 ENCOUNTER — APPOINTMENT (OUTPATIENT)
Dept: PULMONOLOGY | Facility: CLINIC | Age: 72
End: 2022-10-07

## 2022-11-16 ENCOUNTER — RESULT REVIEW (OUTPATIENT)
Age: 72
End: 2022-11-16

## 2022-11-16 ENCOUNTER — APPOINTMENT (OUTPATIENT)
Dept: DERMATOLOGY | Facility: CLINIC | Age: 72
End: 2022-11-16

## 2022-11-16 PROCEDURE — 99212 OFFICE O/P EST SF 10 MIN: CPT | Mod: 25

## 2022-11-16 PROCEDURE — 11102 TANGNTL BX SKIN SINGLE LES: CPT

## 2022-12-15 ENCOUNTER — APPOINTMENT (OUTPATIENT)
Dept: PULMONOLOGY | Facility: CLINIC | Age: 72
End: 2022-12-15

## 2022-12-15 VITALS
RESPIRATION RATE: 16 BRPM | BODY MASS INDEX: 41.75 KG/M2 | HEIGHT: 73 IN | DIASTOLIC BLOOD PRESSURE: 80 MMHG | SYSTOLIC BLOOD PRESSURE: 140 MMHG | WEIGHT: 315 LBS

## 2022-12-15 VITALS — OXYGEN SATURATION: 96 % | HEART RATE: 90 BPM

## 2022-12-15 PROCEDURE — 99214 OFFICE O/P EST MOD 30 MIN: CPT

## 2022-12-15 NOTE — PROCEDURE
[FreeTextEntry1] : hospital records reviewed [facility] 10/21\par CXR nad\par D dimer negative, covid negative, no eos\par ABG with compensated hypercapnia\par ECHO; mild enlarged RV, mod PH\par \par 11/21 CT scan lung cancer screening: emphysema, small nodules, largest 5 mm\par PFts severe air flow obstruction, normal TLC, severely impaired DLCO

## 2022-12-15 NOTE — CONSULT LETTER
[Dear  ___] : Dear  [unfilled], [Consult Letter:] : I had the pleasure of evaluating your patient, [unfilled]. [Please see my note below.] : Please see my note below. [Sincerely,] : Sincerely, [FreeTextEntry3] : Jose De Jesus Brown [facility]\par Pulmonary Critical Care\par Director Pulmonary Division\par Medical Director Respiratory Therapy\par [facility]\par \par

## 2022-12-15 NOTE — HISTORY OF PRESENT ILLNESS
[TextBox_4] : remain smoke free\par has home 02 not using\par - using Trilogy RADHA compliant\par Dyspnea much improved\par Trelegy daily\par no fever, chill, chest pain \par \par

## 2022-12-15 NOTE — DISCUSSION/SUMMARY
[FreeTextEntry1] :  Severe COPD /asthma /Obesity/OHS - cor pulmonale by echocardiogram with PH\par Remains smoke free, dyspnea baseline\par Compliant with  Nocturnal NIV -Trilogy RADHA, - good compliance - 92%, avg , VE 10 L\par continued Smoking cessation stressed\par continue  Trelegy daily and technique reviewed\par Low dose rad CT scan 11/21 reviewed, small nodules- repeat ordered\par , sebaceous cyst followed by dermatology\par Vaccinations need strongly encouraged, refuses Covid or Flu\par Weight loss, Pulmonary rehab discussed\par baseline Cardiology eval [facility]- await records\par alpha 1 , asthma profile ordered\par 5 months  or sooner if needed, action plan reviewed\par will obtain overnight oximetry on Trilogy, call with CT scan, spirometry at follow up\par

## 2023-03-09 ENCOUNTER — OFFICE (OUTPATIENT)
Dept: URBAN - METROPOLITAN AREA CLINIC 104 | Facility: CLINIC | Age: 73
Setting detail: OPHTHALMOLOGY
End: 2023-03-09
Payer: MEDICARE

## 2023-03-09 DIAGNOSIS — H01.001: ICD-10-CM

## 2023-03-09 DIAGNOSIS — H26.491: ICD-10-CM

## 2023-03-09 DIAGNOSIS — Z96.1: ICD-10-CM

## 2023-03-09 DIAGNOSIS — H01.002: ICD-10-CM

## 2023-03-09 DIAGNOSIS — H01.004: ICD-10-CM

## 2023-03-09 DIAGNOSIS — H01.005: ICD-10-CM

## 2023-03-09 PROCEDURE — 99213 OFFICE O/P EST LOW 20 MIN: CPT | Performed by: OPTOMETRIST

## 2023-03-09 ASSESSMENT — LID EXAM ASSESSMENTS
OD_BLEPHARITIS: RLL RUL T 1+
OS_BLEPHARITIS: LLL LUL T 1+

## 2023-03-09 ASSESSMENT — KERATOMETRY
OD_K1POWER_DIOPTERS: 45.86
OS_AXISANGLE_DEGREES: 092
OD_K2POWER_DIOPTERS: 46.68
OD_AXISANGLE_DEGREES: 113
OS_K1POWER_DIOPTERS: 45.86
OS_K2POWER_DIOPTERS: 48.28

## 2023-03-09 ASSESSMENT — SPHEQUIV_DERIVED
OS_SPHEQUIV: 0
OS_SPHEQUIV: 0.125

## 2023-03-09 ASSESSMENT — REFRACTION_MANIFEST
OD_ADD: +2.75
OU_VA: 20/20
OS_AXIS: 100
OS_SPHERE: +0.25
OS_VA1: 20/20
OS_ADD: +2.75
OD_SPHERE: -0.75
OD_CYLINDER: SPH
OD_VA1: 20/20
OD_VA2: 20/20(J1+)
OS_CYLINDER: -0.25
OS_VA2: 20/20(J1+)

## 2023-03-09 ASSESSMENT — CONFRONTATIONAL VISUAL FIELD TEST (CVF)
OS_FINDINGS: FULL
OD_FINDINGS: FULL

## 2023-03-09 ASSESSMENT — VISUAL ACUITY
OD_BCVA: 20/20-1
OS_BCVA: 20/25-1

## 2023-03-09 ASSESSMENT — AXIALLENGTH_DERIVED
OS_AL: 22.35
OS_AL: 22.31

## 2023-03-09 ASSESSMENT — TONOMETRY
OS_IOP_MMHG: 20
OD_IOP_MMHG: 18

## 2023-03-09 ASSESSMENT — REFRACTION_AUTOREFRACTION
OD_SPHERE: PLANO
OD_CYLINDER: -0.75
OS_SPHERE: +0.25
OD_AXIS: 077
OS_AXIS: 006
OS_CYLINDER: -0.50

## 2023-07-17 ENCOUNTER — OFFICE (OUTPATIENT)
Dept: URBAN - METROPOLITAN AREA CLINIC 104 | Facility: CLINIC | Age: 73
Setting detail: OPHTHALMOLOGY
End: 2023-07-17
Payer: MEDICARE

## 2023-07-17 DIAGNOSIS — H26.491: ICD-10-CM

## 2023-07-17 DIAGNOSIS — H43.393: ICD-10-CM

## 2023-07-17 DIAGNOSIS — Z96.1: ICD-10-CM

## 2023-07-17 DIAGNOSIS — H01.004: ICD-10-CM

## 2023-07-17 DIAGNOSIS — H01.005: ICD-10-CM

## 2023-07-17 DIAGNOSIS — H01.002: ICD-10-CM

## 2023-07-17 DIAGNOSIS — H01.001: ICD-10-CM

## 2023-07-17 PROCEDURE — 92014 COMPRE OPH EXAM EST PT 1/>: CPT | Performed by: OPHTHALMOLOGY

## 2023-07-17 ASSESSMENT — KERATOMETRY
OD_AXISANGLE_DEGREES: 118
OS_AXISANGLE_DEGREES: 019
OD_K1POWER_DIOPTERS: 46.17
OS_K2POWER_DIOPTERS: 46.36
OD_K2POWER_DIOPTERS: 47.01
OS_K1POWER_DIOPTERS: 46.30

## 2023-07-17 ASSESSMENT — REFRACTION_MANIFEST
OD_ADD: +2.75
OS_AXIS: 100
OD_VA2: 20/20(J1+)
OS_ADD: +2.75
OD_VA1: 20/20
OD_CYLINDER: SPH
OU_VA: 20/20
OS_SPHERE: +0.25
OS_VA2: 20/20(J1+)
OS_VA1: 20/20
OD_SPHERE: -0.75
OS_CYLINDER: -0.25

## 2023-07-17 ASSESSMENT — REFRACTION_AUTOREFRACTION
OS_SPHERE: PLANO
OD_SPHERE: -0.50
OD_CYLINDER: -0.50
OD_AXIS: 030

## 2023-07-17 ASSESSMENT — CONFRONTATIONAL VISUAL FIELD TEST (CVF)
OS_FINDINGS: FULL
OD_FINDINGS: FULL

## 2023-07-17 ASSESSMENT — VISUAL ACUITY
OS_BCVA: 20/25-1
OD_BCVA: 20/20-1

## 2023-07-17 ASSESSMENT — AXIALLENGTH_DERIVED
OD_AL: 22.78
OS_AL: 22.5517

## 2023-07-17 ASSESSMENT — SPHEQUIV_DERIVED
OD_SPHEQUIV: -0.75
OS_SPHEQUIV: 0.125

## 2023-07-17 ASSESSMENT — TONOMETRY
OD_IOP_MMHG: 11
OS_IOP_MMHG: 11

## 2023-07-17 ASSESSMENT — LID EXAM ASSESSMENTS
OD_BLEPHARITIS: RLL RUL T 1+
OS_BLEPHARITIS: LLL LUL T 1+

## 2023-09-06 ENCOUNTER — APPOINTMENT (OUTPATIENT)
Dept: PULMONOLOGY | Facility: CLINIC | Age: 73
End: 2023-09-06
Payer: MEDICARE

## 2023-09-06 VITALS — OXYGEN SATURATION: 95 %

## 2023-09-06 VITALS
HEIGHT: 74 IN | RESPIRATION RATE: 16 BRPM | BODY MASS INDEX: 40.43 KG/M2 | OXYGEN SATURATION: 92 % | HEART RATE: 79 BPM | WEIGHT: 315 LBS | DIASTOLIC BLOOD PRESSURE: 60 MMHG | SYSTOLIC BLOOD PRESSURE: 150 MMHG

## 2023-09-06 PROCEDURE — 99213 OFFICE O/P EST LOW 20 MIN: CPT

## 2023-09-06 NOTE — HISTORY OF PRESENT ILLNESS
[Former] : former [>= 20 pack years] : >= 20 pack years [YearQuit] : 2021 [TextBox_4] : remain smoke free has home 02 not using, gave tank back - using Trilogy RADHA compliant Dyspnea much improved Trelegy daily no fever, chill, chest pain

## 2023-09-06 NOTE — CONSULT LETTER
[Dear  ___] : Dear  [unfilled], [Consult Letter:] : I had the pleasure of evaluating your patient, [unfilled]. [Please see my note below.] : Please see my note below. [Sincerely,] : Sincerely, [FreeTextEntry3] : Jose De [facility] DO [facility]\par  Pulmonary Critical Care\par  Director Pulmonary Division\par  Medical Director Respiratory Therapy\par  [facility]\par  \par

## 2023-09-06 NOTE — DISCUSSION/SUMMARY
[FreeTextEntry1] :  Severe COPD /asthma /Obesity/OHS - cor pulmonale by echocardiogram with PH Remains smoke free, dyspnea baseline Compliant with  Nocturnal NIV -Trilogy RADHA, - good compliance -100% continued Smoking cessation stressed continue  Trelegy daily and technique reviewed Low dose rad CT scan 11/21 reviewed, small nodules- repeat ordered , sebaceous cyst followed by dermatology Vaccinations need strongly encouraged, refuses Covid or Flu Weight loss discussed, newer wt loss meds baseline Cardiology \[facility]\"" heart gp- await records alpha 1 , asthma profile not done advised to follow pulse oximetry, He does not want 02 currently 4 months  or sooner if needed, action plan reviewed will obtain overnight oximetry on Trilogy, call with CT scan, spirometry at follow up

## 2023-09-06 NOTE — PROCEDURE
[FreeTextEntry1] : hospital records reviewed [facility] 10/21 CXR nad D dimer negative, covid negative, no eos ABG with compensated hypercapnia ECHO; mild enlarged RV, mod PH  11/21 CT scan lung cancer screening: emphysema, small nodules, largest 5 mm PFts severe air flow obstruction, normal TLC, severely impaired DLCO

## 2023-12-04 ENCOUNTER — RX RENEWAL (OUTPATIENT)
Age: 73
End: 2023-12-04

## 2023-12-30 ENCOUNTER — NON-APPOINTMENT (OUTPATIENT)
Age: 73
End: 2023-12-30

## 2024-01-09 NOTE — ED PROVIDER NOTE - CHIEF COMPLAINT
IP:  1/8/24 (Met IP criteria)  DC:  1/9/24  LOS:  < 2MN  Exception: Inpatient only procedure  - Robotic assisted laparoscopic right radical nephrectomy   The patient is a 71y Male complaining of shortness of breath.

## 2024-01-24 ENCOUNTER — APPOINTMENT (OUTPATIENT)
Dept: PULMONOLOGY | Facility: CLINIC | Age: 74
End: 2024-01-24
Payer: MEDICARE

## 2024-01-24 VITALS — HEIGHT: 73 IN | BODY MASS INDEX: 41.75 KG/M2 | WEIGHT: 315 LBS

## 2024-01-24 PROCEDURE — 94010 BREATHING CAPACITY TEST: CPT

## 2024-01-25 ENCOUNTER — NON-APPOINTMENT (OUTPATIENT)
Age: 74
End: 2024-01-25

## 2024-01-26 ENCOUNTER — APPOINTMENT (OUTPATIENT)
Dept: CT IMAGING | Facility: CLINIC | Age: 74
End: 2024-01-26
Payer: MEDICARE

## 2024-01-26 ENCOUNTER — OUTPATIENT (OUTPATIENT)
Dept: OUTPATIENT SERVICES | Facility: HOSPITAL | Age: 74
LOS: 1 days | End: 2024-01-26
Payer: MEDICARE

## 2024-01-26 VITALS — HEIGHT: 73 IN | WEIGHT: 315 LBS | BODY MASS INDEX: 41.75 KG/M2

## 2024-01-26 DIAGNOSIS — Z87.891 PERSONAL HISTORY OF NICOTINE DEPENDENCE: ICD-10-CM

## 2024-01-26 PROCEDURE — 71271 CT THORAX LUNG CANCER SCR C-: CPT

## 2024-01-26 PROCEDURE — 71271 CT THORAX LUNG CANCER SCR C-: CPT | Mod: 26

## 2024-01-26 NOTE — HISTORY OF PRESENT ILLNESS
[TextBox_13] : Referred by Dr. Jose De Jesus Brown.  Mr. FORRESTER is a 73 year old male with a history of COPD, chronic bronchitis and respiratory failure.  Reviewed and confirmed that the patient meets screening eligibility criteria:  73 years old   Smoking Status: Former smoker  Number of pack(s) per day: 1 Number of years smoked: 40 Number of pack years smokin Number of years since quitting smoking: 3 Quit year: Oct 2021  Mr. FORRESTER denies any symptoms of lung cancer, including new cough, change in cough, hemoptysis, and unintentional weight loss.  Mr. FORRESTER denies any personal history of lung cancer.  No lung cancer in a first degree relative.  Denies any history of occupational exposures.  [Former] : Former [PacksperYear] : 40 [YearQuit] : 2021

## 2024-01-29 ENCOUNTER — OFFICE (OUTPATIENT)
Dept: URBAN - METROPOLITAN AREA CLINIC 104 | Facility: CLINIC | Age: 74
Setting detail: OPHTHALMOLOGY
End: 2024-01-29
Payer: MEDICARE

## 2024-01-29 DIAGNOSIS — Z96.1: ICD-10-CM

## 2024-01-29 DIAGNOSIS — H01.004: ICD-10-CM

## 2024-01-29 DIAGNOSIS — H01.002: ICD-10-CM

## 2024-01-29 DIAGNOSIS — H01.005: ICD-10-CM

## 2024-01-29 DIAGNOSIS — H26.491: ICD-10-CM

## 2024-01-29 DIAGNOSIS — H01.001: ICD-10-CM

## 2024-01-29 DIAGNOSIS — H43.393: ICD-10-CM

## 2024-01-29 PROCEDURE — 99213 OFFICE O/P EST LOW 20 MIN: CPT | Performed by: OPHTHALMOLOGY

## 2024-01-29 ASSESSMENT — REFRACTION_AUTOREFRACTION
OD_CYLINDER: -0.50
OS_SPHERE: +1.25
OD_SPHERE: +0.25
OS_AXIS: 093
OD_AXIS: 101
OS_CYLINDER: -0.75

## 2024-01-29 ASSESSMENT — REFRACTION_MANIFEST
OD_VA1: 20/20
OS_VA1: 20/20
OS_CYLINDER: -0.25
OD_VA2: 20/20(J1+)
OS_AXIS: 100
OD_ADD: +2.75
OD_CYLINDER: SPH
OS_ADD: +2.75
OS_VA2: 20/20(J1+)
OD_SPHERE: -0.75
OU_VA: 20/20
OS_SPHERE: +0.25

## 2024-01-29 ASSESSMENT — CONFRONTATIONAL VISUAL FIELD TEST (CVF)
OS_FINDINGS: FULL
OD_FINDINGS: FULL

## 2024-01-29 ASSESSMENT — SPHEQUIV_DERIVED
OS_SPHEQUIV: 0.125
OS_SPHEQUIV: 0.875
OD_SPHEQUIV: 0

## 2024-01-29 ASSESSMENT — LID EXAM ASSESSMENTS
OS_BLEPHARITIS: LLL LUL T 1+
OD_BLEPHARITIS: RLL RUL T 1+

## 2024-02-01 ENCOUNTER — APPOINTMENT (OUTPATIENT)
Dept: DERMATOLOGY | Facility: CLINIC | Age: 74
End: 2024-02-01
Payer: MEDICARE

## 2024-02-01 PROCEDURE — 99214 OFFICE O/P EST MOD 30 MIN: CPT

## 2024-02-02 ENCOUNTER — APPOINTMENT (OUTPATIENT)
Dept: PULMONOLOGY | Facility: CLINIC | Age: 74
End: 2024-02-02
Payer: MEDICARE

## 2024-02-02 VITALS
RESPIRATION RATE: 16 BRPM | OXYGEN SATURATION: 95 % | SYSTOLIC BLOOD PRESSURE: 140 MMHG | HEART RATE: 74 BPM | DIASTOLIC BLOOD PRESSURE: 72 MMHG

## 2024-02-02 VITALS — BODY MASS INDEX: 41.75 KG/M2 | HEIGHT: 73 IN | WEIGHT: 315 LBS

## 2024-02-02 PROCEDURE — 99214 OFFICE O/P EST MOD 30 MIN: CPT

## 2024-02-02 NOTE — PROCEDURE
[FreeTextEntry1] : CT scan 2/24 reviewed, nodules stable, few minute areas of linear atelectasis, Rad report pending carlo with  severe obstruction, decreased FVC and FEV1 from 12/21 ( at relatively similar FVC, TLC showed mild hyperinflation, RV with air trapping)

## 2024-02-02 NOTE — HISTORY OF PRESENT ILLNESS
[Former] : former [>= 20 pack years] : >= 20 pack years [TextBox_4] : remain smoke free no longer has 02 - using Trilogy RADHA compliant Dyspnea at baseline Trelegy daily no fever, chill, chest pain    [YearQuit] : 2021

## 2024-02-02 NOTE — DISCUSSION/SUMMARY
[FreeTextEntry1] :  Severe COPD /asthma /Obesity/OHS - cor pulmonale by echocardiogram with PH Remains smoke free, dyspnea baseline Compliant with  Nocturnal NIV -Trilogy RADHA, - good compliance -,,using and benefitting continued Smoking cessation stressed continue  Trelegy daily and technique reviewed Low dose rad CT scan 2/24 reviewed, small nodules- other than few linear atelectatic like areas, appeared stable, rad report pending , sebaceous cyst followed by dermatology Vaccinations need strongly encouraged, refuses Covid or Flu Weight loss discussed, newer wt loss meds baseline Cardiology eval [facility] gp- Vascular input pending alpha 1 , asthma profile not done, re ordered advised to follow pulse oximetry, He does not want 02 currently 4 months  or sooner if needed, action plan reviewed

## 2024-02-02 NOTE — CONSULT LETTER
[Dear  ___] : Dear  [unfilled], [Consult Letter:] : I had the pleasure of evaluating your patient, [unfilled]. [Please see my note below.] : Please see my note below. [Sincerely,] : Sincerely, [FreeTextEntry3] : Jose De Jesus Brown DO [facility]\par  Pulmonary Critical Care\par  Director Pulmonary Division\par  Medical Director Respiratory Therapy\par  [facility]\par  \par

## 2024-06-06 ENCOUNTER — APPOINTMENT (OUTPATIENT)
Dept: PULMONOLOGY | Facility: CLINIC | Age: 74
End: 2024-06-06
Payer: MEDICARE

## 2024-06-06 VITALS
DIASTOLIC BLOOD PRESSURE: 72 MMHG | HEART RATE: 76 BPM | RESPIRATION RATE: 16 BRPM | SYSTOLIC BLOOD PRESSURE: 132 MMHG | HEIGHT: 74 IN | WEIGHT: 315 LBS | OXYGEN SATURATION: 94 % | BODY MASS INDEX: 40.43 KG/M2

## 2024-06-06 DIAGNOSIS — J44.89 OTHER SPECIFIED CHRONIC OBSTRUCTIVE PULMONARY DISEASE: ICD-10-CM

## 2024-06-06 DIAGNOSIS — J96.21 ACUTE AND CHRONIC RESPIRATORY FAILURE WITH HYPOXIA: ICD-10-CM

## 2024-06-06 DIAGNOSIS — G47.33 OBSTRUCTIVE SLEEP APNEA (ADULT) (PEDIATRIC): ICD-10-CM

## 2024-06-06 DIAGNOSIS — E66.01 MORBID (SEVERE) OBESITY DUE TO EXCESS CALORIES: ICD-10-CM

## 2024-06-06 PROCEDURE — 99213 OFFICE O/P EST LOW 20 MIN: CPT

## 2024-06-06 PROCEDURE — G2211 COMPLEX E/M VISIT ADD ON: CPT

## 2024-06-06 RX ORDER — FLUTICASONE FUROATE, UMECLIDINIUM BROMIDE AND VILANTEROL TRIFENATATE 100; 62.5; 25 UG/1; UG/1; UG/1
100-62.5-25 POWDER RESPIRATORY (INHALATION)
Qty: 1 | Refills: 5 | Status: ACTIVE | COMMUNITY
Start: 2022-03-10 | End: 1900-01-01

## 2024-06-06 NOTE — CONSULT LETTER
[Dear  ___] : Dear  [unfilled], [Consult Letter:] : I had the pleasure of evaluating your patient, [unfilled]. [Please see my note below.] : Please see my note below. [Sincerely,] : Sincerely, [FreeTextEntry3] : Jose De Jesus Brown DO [facility]\par  Pulmonary Critical Care\par  Director Pulmonary Division\par  Medical Director Respiratory Therapy\par  [facility]\par  \par

## 2024-06-06 NOTE — PROCEDURE
[FreeTextEntry1] : CT scan 2/24 reviewed, nodules stable, few minute areas of linear atelectasis, few new minute, f/u 1 yr carlo with  severe obstruction, decreased FVC and FEV1 from 12/21 ( at relatively similar FVC, TLC showed mild hyperinflation, RV with air trapping)

## 2024-06-06 NOTE — DISCUSSION/SUMMARY
[FreeTextEntry1] :  Severe COPD /asthma /Obesity/OHS - cor pulmonale by echocardiogram with PH Remains smoke free, dyspnea baseline Compliant with  Nocturnal NIV -Trilogy RADHA, - good compliance -,,using and benefitting continued Smoking cessation stressed continue  Trelegy daily and technique reviewed Low dose rad CT scan 2/24 reviewed, small nodules-stable or decreased, few new minute,. f/u 1 yr Weight loss discussed, newer wt loss meds baseline Cardiology eval [facility] gp- Vascular input pending alpha 1 , asthma profile not done, re ordered advised to follow pulse oximetry, He does not want 02 currently 4-5  months  or sooner if needed, action plan reviewed

## 2024-06-21 ENCOUNTER — APPOINTMENT (OUTPATIENT)
Dept: DERMATOLOGY | Facility: CLINIC | Age: 74
End: 2024-06-21

## 2024-07-15 ENCOUNTER — APPOINTMENT (OUTPATIENT)
Dept: PULMONOLOGY | Facility: CLINIC | Age: 74
End: 2024-07-15
Payer: MEDICARE

## 2024-07-15 VITALS
BODY MASS INDEX: 40.43 KG/M2 | OXYGEN SATURATION: 92 % | DIASTOLIC BLOOD PRESSURE: 64 MMHG | HEART RATE: 65 BPM | HEIGHT: 74 IN | WEIGHT: 315 LBS | RESPIRATION RATE: 16 BRPM | SYSTOLIC BLOOD PRESSURE: 126 MMHG

## 2024-07-15 VITALS — OXYGEN SATURATION: 94 %

## 2024-07-15 DIAGNOSIS — E66.01 MORBID (SEVERE) OBESITY DUE TO EXCESS CALORIES: ICD-10-CM

## 2024-07-15 DIAGNOSIS — R91.8 OTHER NONSPECIFIC ABNORMAL FINDING OF LUNG FIELD: ICD-10-CM

## 2024-07-15 DIAGNOSIS — J44.89 OTHER SPECIFIED CHRONIC OBSTRUCTIVE PULMONARY DISEASE: ICD-10-CM

## 2024-07-15 DIAGNOSIS — R59.0 LOCALIZED ENLARGED LYMPH NODES: ICD-10-CM

## 2024-07-15 DIAGNOSIS — G47.33 OBSTRUCTIVE SLEEP APNEA (ADULT) (PEDIATRIC): ICD-10-CM

## 2024-07-15 PROCEDURE — G2211 COMPLEX E/M VISIT ADD ON: CPT

## 2024-07-15 PROCEDURE — 99214 OFFICE O/P EST MOD 30 MIN: CPT

## 2024-07-15 RX ORDER — IPRATROPIUM BROMIDE AND ALBUTEROL SULFATE 2.5; .5 MG/3ML; MG/3ML
0.5-2.5 (3) SOLUTION RESPIRATORY (INHALATION) 4 TIMES DAILY
Qty: 1 | Refills: 5 | Status: ACTIVE | COMMUNITY
Start: 2024-07-15 | End: 1900-01-01

## 2024-07-15 RX ORDER — PREDNISONE 10 MG/1
10 TABLET ORAL
Qty: 30 | Refills: 3 | Status: ACTIVE | COMMUNITY
Start: 2024-07-15 | End: 1900-01-01

## 2024-07-15 RX ORDER — DOXYCYCLINE HYCLATE 100 MG/1
100 TABLET ORAL
Qty: 10 | Refills: 3 | Status: ACTIVE | COMMUNITY
Start: 2024-07-15 | End: 1900-01-01

## 2024-07-22 ENCOUNTER — OFFICE (OUTPATIENT)
Dept: URBAN - METROPOLITAN AREA CLINIC 104 | Facility: CLINIC | Age: 74
Setting detail: OPHTHALMOLOGY
End: 2024-07-22
Payer: MEDICARE

## 2024-07-22 DIAGNOSIS — H01.005: ICD-10-CM

## 2024-07-22 DIAGNOSIS — H01.004: ICD-10-CM

## 2024-07-22 DIAGNOSIS — B88.0: ICD-10-CM

## 2024-07-22 DIAGNOSIS — H43.393: ICD-10-CM

## 2024-07-22 DIAGNOSIS — H01.002: ICD-10-CM

## 2024-07-22 DIAGNOSIS — H26.493: ICD-10-CM

## 2024-07-22 DIAGNOSIS — Z96.1: ICD-10-CM

## 2024-07-22 DIAGNOSIS — H01.001: ICD-10-CM

## 2024-07-22 PROCEDURE — 92014 COMPRE OPH EXAM EST PT 1/>: CPT | Performed by: OPHTHALMOLOGY

## 2024-07-22 ASSESSMENT — CONFRONTATIONAL VISUAL FIELD TEST (CVF)
OS_FINDINGS: FULL
OD_FINDINGS: FULL

## 2024-07-22 ASSESSMENT — LID EXAM ASSESSMENTS
OS_BLEPHARITIS: LLL LUL T 1+
OD_BLEPHARITIS: RLL RUL T 1+

## 2024-08-05 ENCOUNTER — OFFICE (OUTPATIENT)
Dept: URBAN - METROPOLITAN AREA CLINIC 104 | Facility: CLINIC | Age: 74
Setting detail: OPHTHALMOLOGY
End: 2024-08-05
Payer: MEDICARE

## 2024-08-05 ENCOUNTER — RX ONLY (RX ONLY)
Age: 74
End: 2024-08-05

## 2024-08-05 DIAGNOSIS — H26.491: ICD-10-CM

## 2024-08-05 PROCEDURE — 66821 AFTER CATARACT LASER SURGERY: CPT | Mod: RT | Performed by: OPHTHALMOLOGY

## 2024-08-05 ASSESSMENT — CONFRONTATIONAL VISUAL FIELD TEST (CVF)
OS_FINDINGS: FULL
OD_FINDINGS: FULL

## 2024-08-05 ASSESSMENT — LID EXAM ASSESSMENTS
OD_BLEPHARITIS: RLL RUL T 1+
OS_BLEPHARITIS: LLL LUL T 1+

## 2024-08-19 ENCOUNTER — RX ONLY (RX ONLY)
Age: 74
End: 2024-08-19

## 2024-08-19 ENCOUNTER — OFFICE (OUTPATIENT)
Dept: URBAN - METROPOLITAN AREA CLINIC 104 | Facility: CLINIC | Age: 74
Setting detail: OPHTHALMOLOGY
End: 2024-08-19
Payer: MEDICARE

## 2024-08-19 DIAGNOSIS — H26.492: ICD-10-CM

## 2024-08-19 PROCEDURE — 66821 AFTER CATARACT LASER SURGERY: CPT | Mod: 79,LT | Performed by: OPHTHALMOLOGY

## 2024-08-19 ASSESSMENT — CONFRONTATIONAL VISUAL FIELD TEST (CVF)
OS_FINDINGS: FULL
OD_FINDINGS: FULL

## 2024-08-19 ASSESSMENT — LID EXAM ASSESSMENTS
OS_BLEPHARITIS: LLL LUL T 1+
OD_BLEPHARITIS: RLL RUL T 1+

## 2024-09-10 ENCOUNTER — APPOINTMENT (OUTPATIENT)
Dept: PULMONOLOGY | Facility: CLINIC | Age: 74
End: 2024-09-10

## 2024-09-23 ENCOUNTER — OFFICE (OUTPATIENT)
Dept: URBAN - METROPOLITAN AREA CLINIC 104 | Facility: CLINIC | Age: 74
Setting detail: OPHTHALMOLOGY
End: 2024-09-23
Payer: MEDICARE

## 2024-09-23 DIAGNOSIS — H43.393: ICD-10-CM

## 2024-09-23 DIAGNOSIS — H01.002: ICD-10-CM

## 2024-09-23 DIAGNOSIS — Z96.1: ICD-10-CM

## 2024-09-23 DIAGNOSIS — H01.004: ICD-10-CM

## 2024-09-23 DIAGNOSIS — H01.001: ICD-10-CM

## 2024-09-23 DIAGNOSIS — H26.493: ICD-10-CM

## 2024-09-23 DIAGNOSIS — B88.0: ICD-10-CM

## 2024-09-23 DIAGNOSIS — H01.005: ICD-10-CM

## 2024-09-23 PROCEDURE — 99024 POSTOP FOLLOW-UP VISIT: CPT | Performed by: OPHTHALMOLOGY

## 2024-09-23 ASSESSMENT — CONFRONTATIONAL VISUAL FIELD TEST (CVF)
OS_FINDINGS: FULL
OD_FINDINGS: FULL

## 2024-09-23 ASSESSMENT — LID EXAM ASSESSMENTS
OD_BLEPHARITIS: RLL RUL T 1+
OS_BLEPHARITIS: LLL LUL T 1+

## 2024-10-16 ENCOUNTER — APPOINTMENT (OUTPATIENT)
Dept: PULMONOLOGY | Facility: CLINIC | Age: 74
End: 2024-10-16

## 2024-11-07 ENCOUNTER — APPOINTMENT (OUTPATIENT)
Dept: PULMONOLOGY | Facility: CLINIC | Age: 74
End: 2024-11-07
Payer: MEDICARE

## 2024-11-07 VITALS
DIASTOLIC BLOOD PRESSURE: 64 MMHG | SYSTOLIC BLOOD PRESSURE: 142 MMHG | OXYGEN SATURATION: 96 % | HEART RATE: 74 BPM | RESPIRATION RATE: 16 BRPM

## 2024-11-07 VITALS — BODY MASS INDEX: 45.71 KG/M2 | WEIGHT: 315 LBS

## 2024-11-07 DIAGNOSIS — R59.0 LOCALIZED ENLARGED LYMPH NODES: ICD-10-CM

## 2024-11-07 DIAGNOSIS — J44.89 OTHER SPECIFIED CHRONIC OBSTRUCTIVE PULMONARY DISEASE: ICD-10-CM

## 2024-11-07 DIAGNOSIS — E66.01 MORBID (SEVERE) OBESITY DUE TO EXCESS CALORIES: ICD-10-CM

## 2024-11-07 DIAGNOSIS — G47.33 OBSTRUCTIVE SLEEP APNEA (ADULT) (PEDIATRIC): ICD-10-CM

## 2024-11-07 PROCEDURE — 99214 OFFICE O/P EST MOD 30 MIN: CPT

## 2024-11-07 PROCEDURE — G2211 COMPLEX E/M VISIT ADD ON: CPT

## 2024-12-03 ENCOUNTER — NON-APPOINTMENT (OUTPATIENT)
Age: 74
End: 2024-12-03

## 2025-03-14 ENCOUNTER — APPOINTMENT (OUTPATIENT)
Dept: PULMONOLOGY | Facility: CLINIC | Age: 75
End: 2025-03-14
Payer: MEDICARE

## 2025-03-14 VITALS — HEIGHT: 72 IN | WEIGHT: 315 LBS | BODY MASS INDEX: 42.66 KG/M2

## 2025-03-14 VITALS
HEART RATE: 63 BPM | RESPIRATION RATE: 16 BRPM | DIASTOLIC BLOOD PRESSURE: 76 MMHG | SYSTOLIC BLOOD PRESSURE: 120 MMHG | OXYGEN SATURATION: 93 %

## 2025-03-14 VITALS — OXYGEN SATURATION: 96 %

## 2025-03-14 DIAGNOSIS — G47.33 OBSTRUCTIVE SLEEP APNEA (ADULT) (PEDIATRIC): ICD-10-CM

## 2025-03-14 DIAGNOSIS — J44.89 OTHER SPECIFIED CHRONIC OBSTRUCTIVE PULMONARY DISEASE: ICD-10-CM

## 2025-03-14 DIAGNOSIS — E66.01 MORBID (SEVERE) OBESITY DUE TO EXCESS CALORIES: ICD-10-CM

## 2025-03-14 PROCEDURE — 99213 OFFICE O/P EST LOW 20 MIN: CPT

## 2025-03-14 PROCEDURE — G2211 COMPLEX E/M VISIT ADD ON: CPT

## 2025-06-09 ENCOUNTER — OFFICE (OUTPATIENT)
Dept: URBAN - METROPOLITAN AREA CLINIC 104 | Facility: CLINIC | Age: 75
Setting detail: OPHTHALMOLOGY
End: 2025-06-09
Payer: MEDICARE

## 2025-06-09 DIAGNOSIS — H01.004: ICD-10-CM

## 2025-06-09 DIAGNOSIS — Z96.1: ICD-10-CM

## 2025-06-09 DIAGNOSIS — H01.005: ICD-10-CM

## 2025-06-09 DIAGNOSIS — B88.0: ICD-10-CM

## 2025-06-09 DIAGNOSIS — H26.493: ICD-10-CM

## 2025-06-09 DIAGNOSIS — H01.002: ICD-10-CM

## 2025-06-09 DIAGNOSIS — H01.001: ICD-10-CM

## 2025-06-09 DIAGNOSIS — H43.393: ICD-10-CM

## 2025-06-09 PROCEDURE — 92012 INTRM OPH EXAM EST PATIENT: CPT | Performed by: OPHTHALMOLOGY

## 2025-06-09 ASSESSMENT — REFRACTION_MANIFEST
OD_VA1: 20/20
OS_VA1: 20/20
OS_ADD: +2.50
OS_AXIS: 85
OD_ADD: +2.50
OS_CYLINDER: -1.25
OD_AXIS: 100
OD_CYLINDER: -1.25
OD_SPHERE: PLANO
OS_SPHERE: +1.25

## 2025-06-09 ASSESSMENT — LID EXAM ASSESSMENTS
OD_BLEPHARITIS: RLL RUL T 1+
OS_BLEPHARITIS: LLL LUL T 1+

## 2025-06-09 ASSESSMENT — REFRACTION_AUTOREFRACTION
OS_AXIS: 092
OD_SPHERE: +0.75
OD_CYLINDER: -1.50
OS_CYLINDER: -1.75
OS_SPHERE: +1.75
OD_AXIS: 090

## 2025-06-09 ASSESSMENT — VISUAL ACUITY
OS_BCVA: 20/20-1
OD_BCVA: 20/20-2

## 2025-06-09 ASSESSMENT — KERATOMETRY
OS_K2POWER_DIOPTERS: 43.30
OD_K2POWER_DIOPTERS: 46.23
OS_AXISANGLE_DEGREES: 026
OD_K1POWER_DIOPTERS: 45.79
OD_AXISANGLE_DEGREES: 142
OS_K1POWER_DIOPTERS: 45.79

## 2025-06-09 ASSESSMENT — CONFRONTATIONAL VISUAL FIELD TEST (CVF)
OD_FINDINGS: FULL
OS_FINDINGS: FULL

## 2025-09-16 ENCOUNTER — APPOINTMENT (OUTPATIENT)
Dept: PULMONOLOGY | Facility: CLINIC | Age: 75
End: 2025-09-16
Payer: MEDICARE

## 2025-09-16 VITALS — OXYGEN SATURATION: 96 %

## 2025-09-16 VITALS
SYSTOLIC BLOOD PRESSURE: 144 MMHG | HEIGHT: 72 IN | RESPIRATION RATE: 16 BRPM | OXYGEN SATURATION: 92 % | WEIGHT: 315 LBS | BODY MASS INDEX: 42.66 KG/M2 | DIASTOLIC BLOOD PRESSURE: 86 MMHG | HEART RATE: 70 BPM

## 2025-09-16 DIAGNOSIS — E66.01 MORBID (SEVERE) OBESITY DUE TO EXCESS CALORIES: ICD-10-CM

## 2025-09-16 DIAGNOSIS — R60.9 EDEMA, UNSPECIFIED: ICD-10-CM

## 2025-09-16 DIAGNOSIS — J44.89 OTHER SPECIFIED CHRONIC OBSTRUCTIVE PULMONARY DISEASE: ICD-10-CM

## 2025-09-16 PROCEDURE — 99214 OFFICE O/P EST MOD 30 MIN: CPT

## 2025-09-16 PROCEDURE — G2211 COMPLEX E/M VISIT ADD ON: CPT

## 2025-09-16 RX ORDER — FLUTICASONE FUROATE, UMECLIDINIUM BROMIDE AND VILANTEROL TRIFENATATE 100; 62.5; 25 UG/1; UG/1; UG/1
100-62.5-25 POWDER RESPIRATORY (INHALATION)
Qty: 3 | Refills: 3 | Status: ACTIVE | COMMUNITY
Start: 2025-09-16 | End: 1900-01-01

## 2025-09-16 RX ORDER — ALBUTEROL SULFATE 90 UG/1
108 (90 BASE) INHALANT RESPIRATORY (INHALATION)
Qty: 1 | Refills: 5 | Status: ACTIVE | COMMUNITY
Start: 2025-09-16 | End: 1900-01-01